# Patient Record
Sex: MALE | Race: WHITE | Employment: UNEMPLOYED | URBAN - METROPOLITAN AREA
[De-identification: names, ages, dates, MRNs, and addresses within clinical notes are randomized per-mention and may not be internally consistent; named-entity substitution may affect disease eponyms.]

---

## 2020-04-30 ENCOUNTER — HOSPITAL ENCOUNTER (EMERGENCY)
Age: 25
Discharge: LEFT BEFORE DISCHARGE | End: 2020-04-30
Payer: COMMERCIAL

## 2020-04-30 VITALS
HEART RATE: 111 BPM | WEIGHT: 182 LBS | SYSTOLIC BLOOD PRESSURE: 154 MMHG | RESPIRATION RATE: 18 BRPM | DIASTOLIC BLOOD PRESSURE: 86 MMHG | BODY MASS INDEX: 25.48 KG/M2 | HEIGHT: 71 IN | OXYGEN SATURATION: 96 % | TEMPERATURE: 97.6 F

## 2020-04-30 NOTE — ED TRIAGE NOTES
Pt to ED to speak with a mental health therapist. Pt denies any thoughts of wanting to harm self or others. Pt states he does not see a therapist on a daily basis but states he  \"probably should\".

## 2020-05-09 ENCOUNTER — HOSPITAL ENCOUNTER (OUTPATIENT)
Age: 25
Setting detail: SPECIMEN
Discharge: HOME | End: 2020-05-09
Payer: COMMERCIAL

## 2020-05-09 LAB
ALBUMIN SERPL-MCNC: 4.1 GM/DL (ref 3.4–5)
ALP BLD-CCNC: 58 IU/L (ref 40–128)
ALT SERPL-CCNC: 76 U/L (ref 10–40)
ANION GAP SERPL CALCULATED.3IONS-SCNC: 13 MMOL/L (ref 4–16)
AST SERPL-CCNC: 55 IU/L (ref 15–37)
BASOPHILS ABSOLUTE: 0.1 K/CU MM
BASOPHILS RELATIVE PERCENT: 0.7 % (ref 0–1)
BILIRUB SERPL-MCNC: 0.2 MG/DL (ref 0–1)
BUN BLDV-MCNC: 14 MG/DL (ref 6–23)
CALCIUM SERPL-MCNC: 9.4 MG/DL (ref 8.3–10.6)
CHLORIDE BLD-SCNC: 101 MMOL/L (ref 99–110)
CHOLESTEROL: 131 MG/DL
CO2: 24 MMOL/L (ref 21–32)
CREAT SERPL-MCNC: 0.9 MG/DL (ref 0.9–1.3)
DIFFERENTIAL TYPE: ABNORMAL
EOSINOPHILS ABSOLUTE: 0.3 K/CU MM
EOSINOPHILS RELATIVE PERCENT: 4.3 % (ref 0–3)
GFR AFRICAN AMERICAN: >60 ML/MIN/1.73M2
GFR NON-AFRICAN AMERICAN: >60 ML/MIN/1.73M2
GLUCOSE BLD-MCNC: 112 MG/DL (ref 70–99)
HCT VFR BLD CALC: 43.7 % (ref 42–52)
HDLC SERPL-MCNC: 53 MG/DL
HEMOGLOBIN: 14.3 GM/DL (ref 13.5–18)
IMMATURE NEUTROPHIL %: 0.3 % (ref 0–0.43)
LDL CHOLESTEROL DIRECT: 82 MG/DL
LYMPHOCYTES ABSOLUTE: 2 K/CU MM
LYMPHOCYTES RELATIVE PERCENT: 29.1 % (ref 24–44)
MCH RBC QN AUTO: 32.3 PG (ref 27–31)
MCHC RBC AUTO-ENTMCNC: 32.7 % (ref 32–36)
MCV RBC AUTO: 98.6 FL (ref 78–100)
MONOCYTES ABSOLUTE: 0.8 K/CU MM
MONOCYTES RELATIVE PERCENT: 11.9 % (ref 0–4)
NUCLEATED RBC %: 0 %
PDW BLD-RTO: 14 % (ref 11.7–14.9)
PLATELET # BLD: 307 K/CU MM (ref 140–440)
PMV BLD AUTO: 9.6 FL (ref 7.5–11.1)
POTASSIUM SERPL-SCNC: 4.7 MMOL/L (ref 3.5–5.1)
RBC # BLD: 4.43 M/CU MM (ref 4.6–6.2)
SEGMENTED NEUTROPHILS ABSOLUTE COUNT: 3.7 K/CU MM
SEGMENTED NEUTROPHILS RELATIVE PERCENT: 53.7 % (ref 36–66)
SODIUM BLD-SCNC: 138 MMOL/L (ref 135–145)
TOTAL IMMATURE NEUTOROPHIL: 0.02 K/CU MM
TOTAL NUCLEATED RBC: 0 K/CU MM
TOTAL PROTEIN: 6.3 GM/DL (ref 6.4–8.2)
TRIGL SERPL-MCNC: 45 MG/DL
WBC # BLD: 6.8 K/CU MM (ref 4–10.5)

## 2020-05-09 PROCEDURE — 36415 COLL VENOUS BLD VENIPUNCTURE: CPT

## 2020-05-09 PROCEDURE — 80053 COMPREHEN METABOLIC PANEL: CPT

## 2020-05-09 PROCEDURE — 83721 ASSAY OF BLOOD LIPOPROTEIN: CPT

## 2020-05-09 PROCEDURE — 85025 COMPLETE CBC W/AUTO DIFF WBC: CPT

## 2020-05-09 PROCEDURE — 80061 LIPID PANEL: CPT

## 2020-05-16 ENCOUNTER — APPOINTMENT (OUTPATIENT)
Dept: ULTRASOUND IMAGING | Age: 25
End: 2020-05-16
Payer: COMMERCIAL

## 2020-05-16 ENCOUNTER — HOSPITAL ENCOUNTER (EMERGENCY)
Age: 25
Discharge: HOME | End: 2020-05-16
Attending: EMERGENCY MEDICINE
Payer: COMMERCIAL

## 2020-05-16 ENCOUNTER — APPOINTMENT (OUTPATIENT)
Dept: GENERAL RADIOLOGY | Age: 25
End: 2020-05-16
Payer: COMMERCIAL

## 2020-05-16 ENCOUNTER — HOSPITAL ENCOUNTER (EMERGENCY)
Age: 25
Discharge: PSYCHIATRIC OR SUBSTANCE USE FACILITY | End: 2020-05-16
Payer: COMMERCIAL

## 2020-05-16 VITALS
TEMPERATURE: 98.1 F | OXYGEN SATURATION: 97 % | SYSTOLIC BLOOD PRESSURE: 140 MMHG | RESPIRATION RATE: 16 BRPM | DIASTOLIC BLOOD PRESSURE: 82 MMHG | HEART RATE: 81 BPM

## 2020-05-16 VITALS
WEIGHT: 185 LBS | HEART RATE: 85 BPM | SYSTOLIC BLOOD PRESSURE: 142 MMHG | TEMPERATURE: 98.2 F | HEIGHT: 71 IN | DIASTOLIC BLOOD PRESSURE: 80 MMHG | RESPIRATION RATE: 14 BRPM | BODY MASS INDEX: 25.9 KG/M2 | OXYGEN SATURATION: 98 %

## 2020-05-16 LAB
ACETAMINOPHEN LEVEL: <5 UG/ML (ref 15–30)
ALBUMIN SERPL-MCNC: 4.5 GM/DL (ref 3.4–5)
ALCOHOL SCREEN SERUM: <0.01 %WT/VOL
ALP BLD-CCNC: 77 IU/L (ref 40–129)
ALT SERPL-CCNC: 127 U/L (ref 10–40)
AMPHETAMINES: NEGATIVE
ANION GAP SERPL CALCULATED.3IONS-SCNC: 14 MMOL/L (ref 4–16)
AST SERPL-CCNC: 37 IU/L (ref 15–37)
BARBITURATE SCREEN URINE: NEGATIVE
BASOPHILS ABSOLUTE: 0.1 K/CU MM
BASOPHILS RELATIVE PERCENT: 0.5 % (ref 0–1)
BENZODIAZEPINE SCREEN, URINE: NEGATIVE
BILIRUB SERPL-MCNC: 0.5 MG/DL (ref 0–1)
BUN BLDV-MCNC: 16 MG/DL (ref 6–23)
CALCIUM SERPL-MCNC: 9.7 MG/DL (ref 8.3–10.6)
CANNABINOID SCREEN URINE: ABNORMAL
CHLORIDE BLD-SCNC: 101 MMOL/L (ref 99–110)
CO2: 24 MMOL/L (ref 21–32)
COCAINE METABOLITE: NEGATIVE
CREAT SERPL-MCNC: 0.8 MG/DL (ref 0.9–1.3)
DIFFERENTIAL TYPE: ABNORMAL
DOSE AMOUNT: ABNORMAL
DOSE AMOUNT: ABNORMAL
DOSE TIME: ABNORMAL
DOSE TIME: ABNORMAL
EOSINOPHILS ABSOLUTE: 0.1 K/CU MM
EOSINOPHILS RELATIVE PERCENT: 0.8 % (ref 0–3)
GFR AFRICAN AMERICAN: >60 ML/MIN/1.73M2
GFR NON-AFRICAN AMERICAN: >60 ML/MIN/1.73M2
GLUCOSE BLD-MCNC: 91 MG/DL (ref 70–99)
HCT VFR BLD CALC: 46.8 % (ref 42–52)
HEMOGLOBIN: 15.7 GM/DL (ref 13.5–18)
IMMATURE NEUTROPHIL %: 0.2 % (ref 0–0.43)
LYMPHOCYTES ABSOLUTE: 2 K/CU MM
LYMPHOCYTES RELATIVE PERCENT: 15.5 % (ref 24–44)
MCH RBC QN AUTO: 32.5 PG (ref 27–31)
MCHC RBC AUTO-ENTMCNC: 33.5 % (ref 32–36)
MCV RBC AUTO: 96.9 FL (ref 78–100)
MONOCYTES ABSOLUTE: 0.8 K/CU MM
MONOCYTES RELATIVE PERCENT: 6.2 % (ref 0–4)
NUCLEATED RBC %: 0 %
OPIATES, URINE: NEGATIVE
OXYCODONE: NEGATIVE
PDW BLD-RTO: 14.4 % (ref 11.7–14.9)
PHENCYCLIDINE, URINE: NEGATIVE
PLATELET # BLD: 417 K/CU MM (ref 140–440)
PMV BLD AUTO: 8.8 FL (ref 7.5–11.1)
POTASSIUM SERPL-SCNC: 4 MMOL/L (ref 3.5–5.1)
RBC # BLD: 4.83 M/CU MM (ref 4.6–6.2)
SALICYLATE LEVEL: <0.3 MG/DL (ref 15–30)
SEGMENTED NEUTROPHILS ABSOLUTE COUNT: 10 K/CU MM
SEGMENTED NEUTROPHILS RELATIVE PERCENT: 76.8 % (ref 36–66)
SODIUM BLD-SCNC: 139 MMOL/L (ref 135–145)
TOTAL IMMATURE NEUTOROPHIL: 0.03 K/CU MM
TOTAL NUCLEATED RBC: 0 K/CU MM
TOTAL PROTEIN: 7.6 GM/DL (ref 6.4–8.2)
WBC # BLD: 13.1 K/CU MM (ref 4–10.5)

## 2020-05-16 PROCEDURE — G0480 DRUG TEST DEF 1-7 CLASSES: HCPCS

## 2020-05-16 PROCEDURE — 73630 X-RAY EXAM OF FOOT: CPT

## 2020-05-16 PROCEDURE — 93971 EXTREMITY STUDY: CPT

## 2020-05-16 PROCEDURE — 80061 LIPID PANEL: CPT

## 2020-05-16 PROCEDURE — 6360000002 HC RX W HCPCS: Performed by: PHYSICIAN ASSISTANT

## 2020-05-16 PROCEDURE — 36415 COLL VENOUS BLD VENIPUNCTURE: CPT

## 2020-05-16 PROCEDURE — 99284 EMERGENCY DEPT VISIT MOD MDM: CPT

## 2020-05-16 PROCEDURE — 85025 COMPLETE CBC W/AUTO DIFF WBC: CPT

## 2020-05-16 PROCEDURE — 80307 DRUG TEST PRSMV CHEM ANLYZR: CPT

## 2020-05-16 PROCEDURE — 83721 ASSAY OF BLOOD LIPOPROTEIN: CPT

## 2020-05-16 PROCEDURE — 96372 THER/PROPH/DIAG INJ SC/IM: CPT

## 2020-05-16 PROCEDURE — 80053 COMPREHEN METABOLIC PANEL: CPT

## 2020-05-16 PROCEDURE — 99283 EMERGENCY DEPT VISIT LOW MDM: CPT

## 2020-05-16 RX ORDER — LORAZEPAM 2 MG/ML
2 INJECTION INTRAMUSCULAR ONCE
Status: COMPLETED | OUTPATIENT
Start: 2020-05-16 | End: 2020-05-16

## 2020-05-16 RX ADMIN — LORAZEPAM 2 MG: 2 INJECTION INTRAMUSCULAR; INTRAVENOUS at 12:17

## 2020-05-16 ASSESSMENT — ENCOUNTER SYMPTOMS
ABDOMINAL PAIN: 0
VOMITING: 0
EYE PAIN: 0
EYE DISCHARGE: 0
NAUSEA: 0
SORE THROAT: 0
COUGH: 0
BACK PAIN: 0
SHORTNESS OF BREATH: 0
RHINORRHEA: 0

## 2020-05-16 ASSESSMENT — PAIN DESCRIPTION - ORIENTATION: ORIENTATION: LEFT

## 2020-05-16 ASSESSMENT — PAIN SCALES - GENERAL: PAINLEVEL_OUTOF10: 0

## 2020-05-16 ASSESSMENT — PAIN DESCRIPTION - LOCATION: LOCATION: FOOT

## 2020-05-16 NOTE — ED NOTES
8617 called [facility] for transportation to [facility].       Radha Ferris  05/16/20 9491
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
## 2020-05-17 LAB
CHOLESTEROL: 145 MG/DL
HDLC SERPL-MCNC: 57 MG/DL
LDL CHOLESTEROL DIRECT: 86 MG/DL
TRIGL SERPL-MCNC: 55 MG/DL

## 2020-06-20 ENCOUNTER — HOSPITAL ENCOUNTER (EMERGENCY)
Age: 25
Discharge: HOME | End: 2020-06-20
Attending: EMERGENCY MEDICINE
Payer: COMMERCIAL

## 2020-06-20 VITALS
OXYGEN SATURATION: 99 % | HEIGHT: 71 IN | SYSTOLIC BLOOD PRESSURE: 132 MMHG | TEMPERATURE: 98.4 F | DIASTOLIC BLOOD PRESSURE: 78 MMHG | WEIGHT: 220 LBS | RESPIRATION RATE: 16 BRPM | BODY MASS INDEX: 30.8 KG/M2 | HEART RATE: 76 BPM

## 2020-06-20 LAB
ACETAMINOPHEN LEVEL: <5 UG/ML (ref 15–30)
ALBUMIN SERPL-MCNC: 4.6 GM/DL (ref 3.4–5)
ALCOHOL SCREEN SERUM: <0.01 %WT/VOL
ALP BLD-CCNC: 75 IU/L (ref 40–129)
ALT SERPL-CCNC: 22 U/L (ref 10–40)
AMPHETAMINES: NEGATIVE
ANION GAP SERPL CALCULATED.3IONS-SCNC: 10 MMOL/L (ref 4–16)
AST SERPL-CCNC: 28 IU/L (ref 15–37)
BARBITURATE SCREEN URINE: NEGATIVE
BASOPHILS ABSOLUTE: 0.1 K/CU MM
BASOPHILS RELATIVE PERCENT: 0.6 % (ref 0–1)
BENZODIAZEPINE SCREEN, URINE: NEGATIVE
BILIRUB SERPL-MCNC: 0.3 MG/DL (ref 0–1)
BUN BLDV-MCNC: 18 MG/DL (ref 6–23)
CALCIUM SERPL-MCNC: 9.8 MG/DL (ref 8.3–10.6)
CANNABINOID SCREEN URINE: ABNORMAL
CHLORIDE BLD-SCNC: 97 MMOL/L (ref 99–110)
CO2: 27 MMOL/L (ref 21–32)
COCAINE METABOLITE: NEGATIVE
CREAT SERPL-MCNC: 1.1 MG/DL (ref 0.9–1.3)
DIFFERENTIAL TYPE: ABNORMAL
DOSE AMOUNT: ABNORMAL
DOSE AMOUNT: ABNORMAL
DOSE TIME: ABNORMAL
DOSE TIME: ABNORMAL
EOSINOPHILS ABSOLUTE: 0.1 K/CU MM
EOSINOPHILS RELATIVE PERCENT: 0.7 % (ref 0–3)
GFR AFRICAN AMERICAN: >60 ML/MIN/1.73M2
GFR NON-AFRICAN AMERICAN: >60 ML/MIN/1.73M2
GLUCOSE BLD-MCNC: 102 MG/DL (ref 70–99)
HCT VFR BLD CALC: 41.9 % (ref 42–52)
HEMOGLOBIN: 14.3 GM/DL (ref 13.5–18)
IMMATURE NEUTROPHIL %: 0.4 % (ref 0–0.43)
LYMPHOCYTES ABSOLUTE: 2.3 K/CU MM
LYMPHOCYTES RELATIVE PERCENT: 19.9 % (ref 24–44)
MCH RBC QN AUTO: 32.9 PG (ref 27–31)
MCHC RBC AUTO-ENTMCNC: 34.1 % (ref 32–36)
MCV RBC AUTO: 96.3 FL (ref 78–100)
MONOCYTES ABSOLUTE: 1 K/CU MM
MONOCYTES RELATIVE PERCENT: 8.4 % (ref 0–4)
NUCLEATED RBC %: 0 %
OPIATES, URINE: NEGATIVE
OXYCODONE: NEGATIVE
PDW BLD-RTO: 13.2 % (ref 11.7–14.9)
PHENCYCLIDINE, URINE: NEGATIVE
PLATELET # BLD: 327 K/CU MM (ref 140–440)
PMV BLD AUTO: 8.8 FL (ref 7.5–11.1)
POTASSIUM SERPL-SCNC: 3.5 MMOL/L (ref 3.5–5.1)
RBC # BLD: 4.35 M/CU MM (ref 4.6–6.2)
SALICYLATE LEVEL: <0.3 MG/DL (ref 15–30)
SEGMENTED NEUTROPHILS ABSOLUTE COUNT: 8.1 K/CU MM
SEGMENTED NEUTROPHILS RELATIVE PERCENT: 70 % (ref 36–66)
SODIUM BLD-SCNC: 134 MMOL/L (ref 135–145)
TOTAL IMMATURE NEUTOROPHIL: 0.05 K/CU MM
TOTAL NUCLEATED RBC: 0 K/CU MM
TOTAL PROTEIN: 7.5 GM/DL (ref 6.4–8.2)
TSH HIGH SENSITIVITY: 2.51 UIU/ML (ref 0.27–4.2)
WBC # BLD: 11.6 K/CU MM (ref 4–10.5)

## 2020-06-20 PROCEDURE — 84443 ASSAY THYROID STIM HORMONE: CPT

## 2020-06-20 PROCEDURE — 80053 COMPREHEN METABOLIC PANEL: CPT

## 2020-06-20 PROCEDURE — 85025 COMPLETE CBC W/AUTO DIFF WBC: CPT

## 2020-06-20 PROCEDURE — 80307 DRUG TEST PRSMV CHEM ANLYZR: CPT

## 2020-06-20 PROCEDURE — 6370000000 HC RX 637 (ALT 250 FOR IP): Performed by: EMERGENCY MEDICINE

## 2020-06-20 PROCEDURE — 99285 EMERGENCY DEPT VISIT HI MDM: CPT

## 2020-06-20 PROCEDURE — G0480 DRUG TEST DEF 1-7 CLASSES: HCPCS

## 2020-06-20 RX ORDER — NICOTINE 21 MG/24HR
1 PATCH, TRANSDERMAL 24 HOURS TRANSDERMAL DAILY
Status: DISCONTINUED | OUTPATIENT
Start: 2020-06-20 | End: 2020-06-20 | Stop reason: HOSPADM

## 2020-06-20 RX ORDER — BUSPIRONE HYDROCHLORIDE 10 MG/1
10 TABLET ORAL 3 TIMES DAILY
Status: ON HOLD | COMMUNITY
End: 2020-08-17 | Stop reason: HOSPADM

## 2020-06-20 ASSESSMENT — ENCOUNTER SYMPTOMS
GASTROINTESTINAL NEGATIVE: 1
ALLERGIC/IMMUNOLOGIC NEGATIVE: 1
RESPIRATORY NEGATIVE: 1
EYES NEGATIVE: 1
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
## 2020-06-20 NOTE — ED NOTES
Elida Apodaca advises this nurse that the patient has continued to say, during her assessment, that he is not feeling like harming himself. He is very concerned about not having his medications of haldol, geodon, and buspar times 4 days, which is why he says he has been unable to sleep. Where he will be able to sleep is a concern, as well, as his father kicked him out of the house, according to the patient. Get Vasquez sts she will consult with case management here. The patient has also expressed his desire to know his blood type, and that he has the cure for COVID19 in his blood. One reason he may not have been taking his medications is because he has used marijuana recently, and something he says he heard from his psychiatrist was negative concerning mix pot with his meds.       Ana Gill RN  06/20/20 9563

## 2020-06-20 NOTE — ED NOTES
The sitter is excused at this time. The patient has a plan to follow up with Angel Russo and to follow up with [facility].       Aby Astudillo RN  06/20/20 0610

## 2020-06-24 ENCOUNTER — HOSPITAL ENCOUNTER (EMERGENCY)
Age: 25
Discharge: HOME | End: 2020-06-24
Attending: EMERGENCY MEDICINE
Payer: COMMERCIAL

## 2020-06-24 ENCOUNTER — APPOINTMENT (OUTPATIENT)
Dept: CT IMAGING | Age: 25
End: 2020-06-24
Payer: COMMERCIAL

## 2020-06-24 VITALS
HEART RATE: 78 BPM | RESPIRATION RATE: 17 BRPM | WEIGHT: 220 LBS | HEIGHT: 71 IN | BODY MASS INDEX: 30.8 KG/M2 | DIASTOLIC BLOOD PRESSURE: 72 MMHG | SYSTOLIC BLOOD PRESSURE: 134 MMHG | OXYGEN SATURATION: 99 % | TEMPERATURE: 98.3 F

## 2020-06-24 LAB
ALBUMIN SERPL-MCNC: 4.1 GM/DL (ref 3.4–5)
ALP BLD-CCNC: 71 IU/L (ref 40–129)
ALT SERPL-CCNC: 22 U/L (ref 10–40)
AMPHETAMINES: NEGATIVE
ANION GAP SERPL CALCULATED.3IONS-SCNC: 9 MMOL/L (ref 4–16)
AST SERPL-CCNC: 28 IU/L (ref 15–37)
BACTERIA: NEGATIVE /HPF
BARBITURATE SCREEN URINE: NEGATIVE
BASOPHILS ABSOLUTE: 0.1 K/CU MM
BASOPHILS RELATIVE PERCENT: 0.6 % (ref 0–1)
BENZODIAZEPINE SCREEN, URINE: NEGATIVE
BILIRUB SERPL-MCNC: 0.2 MG/DL (ref 0–1)
BILIRUBIN URINE: NEGATIVE MG/DL
BLOOD, URINE: NEGATIVE
BUN BLDV-MCNC: 16 MG/DL (ref 6–23)
CALCIUM SERPL-MCNC: 9.2 MG/DL (ref 8.3–10.6)
CANNABINOID SCREEN URINE: ABNORMAL
CAST TYPE: ABNORMAL /HPF
CHLORIDE BLD-SCNC: 102 MMOL/L (ref 99–110)
CLARITY: CLEAR
CO2: 27 MMOL/L (ref 21–32)
COCAINE METABOLITE: NEGATIVE
COLOR: ABNORMAL
CREAT SERPL-MCNC: 1 MG/DL (ref 0.9–1.3)
CRYSTAL TYPE: NEGATIVE /HPF
DIFFERENTIAL TYPE: ABNORMAL
EOSINOPHILS ABSOLUTE: 0.3 K/CU MM
EOSINOPHILS RELATIVE PERCENT: 2.8 % (ref 0–3)
EPITHELIAL CELLS, UA: 1 /HPF
GFR AFRICAN AMERICAN: >60 ML/MIN/1.73M2
GFR NON-AFRICAN AMERICAN: >60 ML/MIN/1.73M2
GLUCOSE BLD-MCNC: 122 MG/DL (ref 70–99)
GLUCOSE, URINE: NEGATIVE MG/DL
HCT VFR BLD CALC: 39.7 % (ref 42–52)
HEMOGLOBIN: 13.8 GM/DL (ref 13.5–18)
IMMATURE NEUTROPHIL %: 0.4 % (ref 0–0.43)
KETONES, URINE: NEGATIVE MG/DL
LEUKOCYTE ESTERASE, URINE: NEGATIVE
LIPASE: 39 IU/L (ref 13–60)
LYMPHOCYTES ABSOLUTE: 3 K/CU MM
LYMPHOCYTES RELATIVE PERCENT: 30.3 % (ref 24–44)
MCH RBC QN AUTO: 32.9 PG (ref 27–31)
MCHC RBC AUTO-ENTMCNC: 34.8 % (ref 32–36)
MCV RBC AUTO: 94.7 FL (ref 78–100)
MONOCYTES ABSOLUTE: 0.9 K/CU MM
MONOCYTES RELATIVE PERCENT: 8.8 % (ref 0–4)
NITRITE URINE, QUANTITATIVE: NEGATIVE
OPIATES, URINE: NEGATIVE
OXYCODONE: NEGATIVE
PDW BLD-RTO: 13.2 % (ref 11.7–14.9)
PH, URINE: 7 (ref 5–8)
PHENCYCLIDINE, URINE: NEGATIVE
PLATELET # BLD: 268 K/CU MM (ref 140–440)
PMV BLD AUTO: 9 FL (ref 7.5–11.1)
POTASSIUM SERPL-SCNC: 3.8 MMOL/L (ref 3.5–5.1)
PROTEIN UA: NEGATIVE MG/DL
RBC # BLD: 4.19 M/CU MM (ref 4.6–6.2)
RBC URINE: ABNORMAL /HPF (ref 0–3)
SEGMENTED NEUTROPHILS ABSOLUTE COUNT: 5.6 K/CU MM
SEGMENTED NEUTROPHILS RELATIVE PERCENT: 57.1 % (ref 36–66)
SODIUM BLD-SCNC: 138 MMOL/L (ref 135–145)
SPECIFIC GRAVITY UA: 1.01 (ref 1–1.03)
TOTAL IMMATURE NEUTOROPHIL: 0.04 K/CU MM
TOTAL PROTEIN: 6.9 GM/DL (ref 6.4–8.2)
UROBILINOGEN, URINE: 0.2 MG/DL (ref 0.2–1)
WBC # BLD: 9.9 K/CU MM (ref 4–10.5)
WBC UA: 1 /HPF (ref 0–2)

## 2020-06-24 PROCEDURE — 2580000003 HC RX 258: Performed by: EMERGENCY MEDICINE

## 2020-06-24 PROCEDURE — 96365 THER/PROPH/DIAG IV INF INIT: CPT

## 2020-06-24 PROCEDURE — 74176 CT ABD & PELVIS W/O CONTRAST: CPT

## 2020-06-24 PROCEDURE — 85025 COMPLETE CBC W/AUTO DIFF WBC: CPT

## 2020-06-24 PROCEDURE — 6360000002 HC RX W HCPCS: Performed by: EMERGENCY MEDICINE

## 2020-06-24 PROCEDURE — 96375 TX/PRO/DX INJ NEW DRUG ADDON: CPT

## 2020-06-24 PROCEDURE — 81001 URINALYSIS AUTO W/SCOPE: CPT

## 2020-06-24 PROCEDURE — 80307 DRUG TEST PRSMV CHEM ANLYZR: CPT

## 2020-06-24 PROCEDURE — 99284 EMERGENCY DEPT VISIT MOD MDM: CPT

## 2020-06-24 PROCEDURE — 80053 COMPREHEN METABOLIC PANEL: CPT

## 2020-06-24 PROCEDURE — 83690 ASSAY OF LIPASE: CPT

## 2020-06-24 RX ORDER — KETOROLAC TROMETHAMINE 10 MG/1
10 TABLET, FILM COATED ORAL EVERY 6 HOURS PRN
Qty: 20 TABLET | Refills: 0 | Status: ON HOLD | OUTPATIENT
Start: 2020-06-24 | End: 2020-08-17 | Stop reason: HOSPADM

## 2020-06-24 RX ORDER — ONDANSETRON 4 MG/1
4 TABLET, ORALLY DISINTEGRATING ORAL EVERY 8 HOURS PRN
Qty: 20 TABLET | Refills: 0 | Status: SHIPPED | OUTPATIENT
Start: 2020-06-24 | End: 2020-07-01

## 2020-06-24 RX ORDER — SODIUM CHLORIDE, SODIUM LACTATE, POTASSIUM CHLORIDE, CALCIUM CHLORIDE 600; 310; 30; 20 MG/100ML; MG/100ML; MG/100ML; MG/100ML
1000 INJECTION, SOLUTION INTRAVENOUS ONCE
Status: COMPLETED | OUTPATIENT
Start: 2020-06-24 | End: 2020-06-24

## 2020-06-24 RX ORDER — MORPHINE SULFATE 4 MG/ML
4 INJECTION, SOLUTION INTRAMUSCULAR; INTRAVENOUS
Status: DISCONTINUED | OUTPATIENT
Start: 2020-06-24 | End: 2020-06-24 | Stop reason: HOSPADM

## 2020-06-24 RX ORDER — CLOTRIMAZOLE 1 %
CREAM (GRAM) TOPICAL
Qty: 1 TUBE | Refills: 2 | Status: SHIPPED | OUTPATIENT
Start: 2020-06-24 | End: 2020-07-01

## 2020-06-24 RX ORDER — TAMSULOSIN HYDROCHLORIDE 0.4 MG/1
0.4 CAPSULE ORAL DAILY
Qty: 5 CAPSULE | Refills: 0 | Status: ON HOLD | OUTPATIENT
Start: 2020-06-24 | End: 2020-08-17 | Stop reason: HOSPADM

## 2020-06-24 RX ORDER — CYCLOBENZAPRINE HCL 10 MG
10 TABLET ORAL NIGHTLY PRN
Qty: 30 TABLET | Refills: 0 | Status: SHIPPED | OUTPATIENT
Start: 2020-06-24 | End: 2020-07-04

## 2020-06-24 RX ORDER — ONDANSETRON 2 MG/ML
4 INJECTION INTRAMUSCULAR; INTRAVENOUS EVERY 30 MIN PRN
Status: DISCONTINUED | OUTPATIENT
Start: 2020-06-24 | End: 2020-06-24 | Stop reason: HOSPADM

## 2020-06-24 RX ORDER — KETOROLAC TROMETHAMINE 30 MG/ML
30 INJECTION, SOLUTION INTRAMUSCULAR; INTRAVENOUS ONCE
Status: COMPLETED | OUTPATIENT
Start: 2020-06-24 | End: 2020-06-24

## 2020-06-24 RX ADMIN — ONDANSETRON 4 MG: 2 INJECTION INTRAMUSCULAR; INTRAVENOUS at 04:05

## 2020-06-24 RX ADMIN — SODIUM CHLORIDE, POTASSIUM CHLORIDE, SODIUM LACTATE AND CALCIUM CHLORIDE 1000 ML: 600; 310; 30; 20 INJECTION, SOLUTION INTRAVENOUS at 04:05

## 2020-06-24 RX ADMIN — KETOROLAC TROMETHAMINE 30 MG: 30 INJECTION, SOLUTION INTRAMUSCULAR; INTRAVENOUS at 04:05

## 2020-06-24 ASSESSMENT — ENCOUNTER SYMPTOMS
BACK PAIN: 0
CHEST TIGHTNESS: 0
GASTROINTESTINAL NEGATIVE: 1
DIARRHEA: 0
CHOKING: 0
EYE DISCHARGE: 0
RECTAL PAIN: 0
FACIAL SWELLING: 0
STRIDOR: 0
CONSTIPATION: 0
RESPIRATORY NEGATIVE: 1
RHINORRHEA: 0
APNEA: 0
BLOOD IN STOOL: 0
TROUBLE SWALLOWING: 0
EYE REDNESS: 0
EYE PAIN: 0
PHOTOPHOBIA: 0
EYES NEGATIVE: 1
WHEEZING: 0
VOICE CHANGE: 0
ABDOMINAL PAIN: 0
EYE ITCHING: 0
SHORTNESS OF BREATH: 0
NAUSEA: 0
SINUS PAIN: 0
COUGH: 0
SINUS PRESSURE: 0
VOMITING: 0

## 2020-06-24 ASSESSMENT — PAIN DESCRIPTION - PAIN TYPE: TYPE: ACUTE PAIN

## 2020-06-24 ASSESSMENT — PAIN DESCRIPTION - ORIENTATION: ORIENTATION: LEFT

## 2020-06-24 ASSESSMENT — PAIN DESCRIPTION - FREQUENCY: FREQUENCY: CONTINUOUS

## 2020-06-24 ASSESSMENT — PAIN SCALES - GENERAL
PAINLEVEL_OUTOF10: 6
PAINLEVEL_OUTOF10: 6

## 2020-06-24 ASSESSMENT — PAIN DESCRIPTION - PROGRESSION: CLINICAL_PROGRESSION: RESOLVED

## 2020-06-24 ASSESSMENT — PAIN DESCRIPTION - ONSET: ONSET: ON-GOING

## 2020-06-24 ASSESSMENT — PAIN DESCRIPTION - LOCATION: LOCATION: FLANK

## 2020-06-24 NOTE — ED TRIAGE NOTES
Pt brought to the ED by EMS for bilateral flank pain and fungal infection to his feet per report. Pt states Lt sided flank pain is severe compared to Rt side.

## 2020-06-24 NOTE — ED PROVIDER NOTES
98.3 °F (36.8 °C)   TempSrc: Oral   SpO2: 96%   Weight: 220 lb (99.8 kg)   Height: 5' 11\" (1.803 m)           MDM  Number of Diagnoses or Management Options  Flank pain:   Tinea pedis of both feet:   Diagnosis management comments: 78-year-old male presents emergency department with chief complaint of left-sided flank pain that started approximately 12 hours ago. CT of the abdomen pelvis was negative for ureteral stone. Patient also has tinea pedis. Patient was given clotrimazole, ketorolac, Flexeril. Discharged home with return precautions and primary care follow-up. Amount and/or Complexity of Data Reviewed  Clinical lab tests: ordered and reviewed  Tests in the radiology section of CPT®: ordered and reviewed  Tests in the medicine section of CPT®: ordered and reviewed    Risk of Complications, Morbidity, and/or Mortality  Presenting problems: moderate  Diagnostic procedures: moderate  Management options: moderate    Critical Care  Total time providing critical care: < 30 minutes    Patient Progress  Patient progress: improved        REASSESSMENT          CRITICAL CARE TIME     Total critical care time provided today was 0 minutes. This excludes seperately billable procedures and family discussion time. Critical care time provided for obtaining history, conducting a physical exam, performing and monitoring interventions, ordering, collecting and interpreting tests, and establishing medical decision-making. There was a potential for life/limb threatening pathology requiring close evaluation and intervention with concern for patient decompensation. CONSULTS:  None    PROCEDURES:  None performed unless otherwise noted below     Procedures        FINAL IMPRESSION      1. Flank pain    2.  Tinea pedis of both feet          DISPOSITION/PLAN   DISPOSITION Decision To Discharge 06/24/2020 04:02:24 AM      PATIENT REFERRED TO:  Angel Russo MD  [address] [place] [address]     In 3

## 2020-06-24 NOTE — ED NOTES
Levi Billing with [employer] called back, they will be here in approximately 30 minutes to take pt home.       Zaira Loza RN  06/24/20 6009

## 2020-06-24 NOTE — ED NOTES
Bed: E06  Expected date:   Expected time:   Means of arrival:   Comments:  ems     Catracho Olmos RN  06/24/20 6253

## 2020-06-24 NOTE — ED NOTES
Convenient transport has not yet called back, pt provided this RN with pt sister Guilherme Rios' number, [phone]. Attempted to contact sister at provided number per pt. No answer, message left to call this RN back.       Ghulam Joyner RN  06/24/20 8624

## 2020-06-24 NOTE — ED NOTES
Multiple attempts made to call pt sister and convenient transport without success. [facility] [employer] called, message left to call back.       Rosa Isela Hui RN  06/24/20 9512

## 2020-06-24 NOTE — ED NOTES
Pt states does not have a ride home, usually get transportation set up by social workers at [facility] when discharged.       Candelario Aguilera RN  06/24/20 3244

## 2020-06-29 ENCOUNTER — HOSPITAL ENCOUNTER (EMERGENCY)
Age: 25
Discharge: HOME | End: 2020-06-29
Payer: COMMERCIAL

## 2020-06-29 VITALS
DIASTOLIC BLOOD PRESSURE: 76 MMHG | BODY MASS INDEX: 30.8 KG/M2 | SYSTOLIC BLOOD PRESSURE: 142 MMHG | WEIGHT: 220 LBS | RESPIRATION RATE: 18 BRPM | HEART RATE: 87 BPM | TEMPERATURE: 98.4 F | HEIGHT: 71 IN | OXYGEN SATURATION: 97 %

## 2020-06-29 PROCEDURE — 99282 EMERGENCY DEPT VISIT SF MDM: CPT

## 2020-06-29 NOTE — ED PROVIDER NOTES
EMERGENCY DEPARTMENT ENCOUNTER      PCP: Yelena Baker MD    CHIEF COMPLAINT    Chief Complaint   Patient presents with    Other     pt c/o fungal infection to his feet acute on chronic        This patient was not evaluated by the attending physician. I have independently evaluated this patient. HPI    Eun Albright is a 30 y.o. male who presents with rash and pain in bilateral feet. Patient says he was prescribed antifungal cream a week ago and was putting on once a day for 3 days and then 2 times a day for the next 4 days. He says that is helping a little bit but the rash has not fully gone away and it hurts for him to walk. He says that he walks everywhere because he wants to \"reduce his carbon footprint\" and he has just recently began changing his socks every day. He denies any sensory or functional deficit in the lower extremities. No fevers, nausea, vomiting or diarrhea.         REVIEW OF SYSTEMS    Constitutional:  Denies fever, chills, weight loss or weakness   HENT:  Denies sore throat or ear pain   Cardiovascular:  Denies chest pain, palpitations   Respiratory:  Denies cough or shortness of breath    GI:  Denies abdominal pain, nausea, vomiting, or diarrhea  :  Denies any urinary symptoms   Musculoskeletal:  Denies back pain  Skin: See HPI  Neurologic:  Denies headache, focal weakness or sensory changes   Endocrine:  Denies polyuria or polydypsia   Lymphatic:  Denies swollen glands     All other review of systems are negative  See HPI and nursing notes for additional information     PAST MEDICAL AND SURGICAL HISTORY    Past Medical History:   Diagnosis Date    Arachnoid cyst     Asthma     Eczema     GERD (gastroesophageal reflux disease)     Mood disorder, drug-induced ([facility] [address]     Suicidal ideation      Past Surgical History:   Procedure Laterality Date    APPENDECTOMY      BRAIN SURGERY         CURRENT MEDICATIONS    Current Outpatient Rx   Medication Sig Dispense Refill    ketorolac (TORADOL) 10 MG tablet Take 1 tablet by mouth every 6 hours as needed for Pain 20 tablet 0    ondansetron (ZOFRAN-ODT) 4 MG disintegrating tablet Place 1 tablet under the tongue every 8 hours as needed for Nausea or Vomiting May Sub regular tablet (non-ODT) if insurance does not cover ODT. 20 tablet 0    tamsulosin (FLOMAX) 0.4 MG capsule Take 1 capsule by mouth daily for 5 days 5 capsule 0    clotrimazole (LOTRIMIN AF) 1 % cream Apply topically 2 times daily. 1 Tube 2    cyclobenzaprine (FLEXERIL) 10 MG tablet Take 1 tablet by mouth nightly as needed for Muscle spasms 30 tablet 0    busPIRone (BUSPAR) 10 MG tablet Take 10 mg by mouth 3 times daily One in the morning and two before bed      HydrOXYzine Pamoate (VISTARIL PO) Take by mouth nightly      melatonin 3 MG TABS tablet Take 3 mg by mouth nightly as needed      Fluticasone Furoate-Vilanterol (BREO ELLIPTA IN) Inhale into the lungs      albuterol sulfate HFA (PROVENTIL HFA) 108 (90 Base) MCG/ACT inhaler Inhale 2 puffs into the lungs every 4 hours as needed for Wheezing or Shortness of Breath With spacer (and mask if indicated). Thanks. 1 Inhaler 1    Respiratory Therapy Supplies (NEBULIZER COMPRESSOR) KIT 1 kit by Does not apply route once for 1 dose. 1 kit 0    ipratropium (ATROVENT) 0.02 % nebulizer solution Take 2.5 mLs by nebulization 4 times daily. 50 mL 1    traZODone (DESYREL) 50 MG tablet Take 50 mg by mouth nightly.  ziprasidone (GEODON) 80 MG capsule Take 60 mg by mouth nightly.          ALLERGIES    Allergies   Allergen Reactions    Latex Itching    Chantix [Varenicline] Other (See Comments)     Causes suicidal thoughts    Seasonal Other (See Comments)     Dust mites, pollen, weed, etc - \"it makes my asthma act up\"       SOCIAL AND FAMILY HISTORY    Social History     Socioeconomic History    Marital status: Single     Spouse name: None    Number of children: None    Years of education: None    Highest education level: None   Occupational History    None   Social Needs    Financial resource strain: None    Food insecurity     Worry: None     Inability: None    Transportation needs     Medical: None     Non-medical: None   Tobacco Use    Smoking status: Current Every Day Smoker     Packs/day: 1.50     Types: Cigarettes    Smokeless tobacco: Never Used   Substance and Sexual Activity    Alcohol use: Yes     Comment: 3 beers, two shots of liquor     Drug use: Yes     Frequency: 5.0 times per week     Types: Marijuana    Sexual activity: Never   Lifestyle    Physical activity     Days per week: None     Minutes per session: None    Stress: None   Relationships    Social connections     Talks on phone: None     Gets together: None     Attends Anglican service: None     Active member of club or organization: None     Attends meetings of clubs or organizations: None     Relationship status: None    Intimate partner violence     Fear of current or ex partner: None     Emotionally abused: None     Physically abused: None     Forced sexual activity: None   Other Topics Concern    None   Social History Narrative    ** Merged History Encounter **          History reviewed. No pertinent family history. PHYSICAL EXAM    VITAL SIGNS: BP (!) 142/76   Pulse 87   Temp 98.4 °F (36.9 °C) (Temporal)   Resp 18   Ht 5' 11\" (1.803 m)   Wt 220 lb (99.8 kg)   SpO2 97%   BMI 30.68 kg/m²    Constitutional:  Well developed, Well nourished, NAD  HENT:  Normocephalic, Atraumatic, PERRL. EOMI. Sclera clear. Conjunctiva normal, No discharge. Neck/Lymphatics: supple, no JVD, no swollen nodes  Cardiovascular:  Rate 87, regular rhythm,  no murmurs/rubs/gallops. Respiratory:  Nonlabored breathing. Normal breath sounds, No wheezing  Abdomen: Bowel sounds normal, Soft, No tenderness, no masses. Musculoskeletal:    There is no edema, asymmetry, or calf / thigh tenderness bilaterally. No cyanosis.     No cool or pale-appearing limb.  Distal cap refill and pulses intact bilateral upper and lower extremities  Bilateral upper and lower extremity ROM intact without pain or obvious deficit  Integument: There is erythema with scale between the toes of bilateral feet  Neurologic: Alert & oriented , No focal deficits noted. Cranial nerves II through XII grossly intact. Normal gross motor coordination & motor strength bilateral upper and lower extremities  Sensation intact. Psychiatric:  Affect normal, Mood normal.         ED COURSE & MEDICAL DECISION MAKING       Patient presents as above with complaints of itching and pain in the feet. Physical exam reveals a rash that appears to be tinea pedis he is currently being treated for this condition with. He is only been using a cream for 1 week. Discussed with him that fungal infections can take a long time to clear and encouraged him to continue to use the cream that he was prescribed. We also had a lengthy discussion about foot care techniques and the importance of keeping his feet clean and dry. He understands and agrees with this plan of care and return precautions were reviewed at length. Time allotted for answer questions and patient is ready for discharge. Patient agrees to return emergency department if symptoms worsen or any new symptoms develop. Vital signs and nursing notes reviewed during ED course. Clinical  IMPRESSION    1. Tinea pedis of both feet          Comment: Please note this report has been produced using speech recognition software and may contain errors related to that system including errors in grammar, punctuation, and spelling, as well as words and phrases that may be inappropriate. If there are any questions or concerns please feel free to contact the dictating provider for clarification.           [place], [place]  06/29/20 5760

## 2020-06-29 NOTE — ED TRIAGE NOTES
Pt returned to the ED for c/o fungal infection to bilateral feet to which he has been treating with topical cream but states no relief.

## 2020-07-02 ENCOUNTER — HOSPITAL ENCOUNTER (EMERGENCY)
Age: 25
Discharge: HOME | End: 2020-07-03
Attending: EMERGENCY MEDICINE
Payer: COMMERCIAL

## 2020-07-02 LAB
ALBUMIN SERPL-MCNC: 4.4 GM/DL (ref 3.4–5)
ALCOHOL SCREEN SERUM: <0.01 %WT/VOL
ALP BLD-CCNC: 65 IU/L (ref 40–128)
ALT SERPL-CCNC: 24 U/L (ref 10–40)
AMPHETAMINES: NEGATIVE
ANION GAP SERPL CALCULATED.3IONS-SCNC: 10 MMOL/L (ref 4–16)
AST SERPL-CCNC: 33 IU/L (ref 15–37)
BARBITURATE SCREEN URINE: NEGATIVE
BASOPHILS ABSOLUTE: 0.1 K/CU MM
BASOPHILS RELATIVE PERCENT: 0.6 % (ref 0–1)
BENZODIAZEPINE SCREEN, URINE: NEGATIVE
BILIRUB SERPL-MCNC: 0.7 MG/DL (ref 0–1)
BUN BLDV-MCNC: 16 MG/DL (ref 6–23)
CALCIUM SERPL-MCNC: 9.6 MG/DL (ref 8.3–10.6)
CANNABINOID SCREEN URINE: ABNORMAL
CHLORIDE BLD-SCNC: 101 MMOL/L (ref 99–110)
CO2: 23 MMOL/L (ref 21–32)
COCAINE METABOLITE: NEGATIVE
CREAT SERPL-MCNC: 0.9 MG/DL (ref 0.9–1.3)
DIFFERENTIAL TYPE: ABNORMAL
EOSINOPHILS ABSOLUTE: 0.2 K/CU MM
EOSINOPHILS RELATIVE PERCENT: 2.2 % (ref 0–3)
GFR AFRICAN AMERICAN: >60 ML/MIN/1.73M2
GFR NON-AFRICAN AMERICAN: >60 ML/MIN/1.73M2
GLUCOSE BLD-MCNC: 77 MG/DL (ref 70–99)
HCT VFR BLD CALC: 41.8 % (ref 42–52)
HEMOGLOBIN: 14.1 GM/DL (ref 13.5–18)
IMMATURE NEUTROPHIL %: 0.4 % (ref 0–0.43)
LYMPHOCYTES ABSOLUTE: 2.2 K/CU MM
LYMPHOCYTES RELATIVE PERCENT: 21.6 % (ref 24–44)
MCH RBC QN AUTO: 32.8 PG (ref 27–31)
MCHC RBC AUTO-ENTMCNC: 33.7 % (ref 32–36)
MCV RBC AUTO: 97.2 FL (ref 78–100)
MONOCYTES ABSOLUTE: 1 K/CU MM
MONOCYTES RELATIVE PERCENT: 10.2 % (ref 0–4)
NUCLEATED RBC %: 0 %
OPIATES, URINE: NEGATIVE
OXYCODONE: NEGATIVE
PDW BLD-RTO: 13.5 % (ref 11.7–14.9)
PHENCYCLIDINE, URINE: NEGATIVE
PLATELET # BLD: 311 K/CU MM (ref 140–440)
PMV BLD AUTO: 8.8 FL (ref 7.5–11.1)
POTASSIUM SERPL-SCNC: 3.7 MMOL/L (ref 3.5–5.1)
RBC # BLD: 4.3 M/CU MM (ref 4.6–6.2)
SEGMENTED NEUTROPHILS ABSOLUTE COUNT: 6.6 K/CU MM
SEGMENTED NEUTROPHILS RELATIVE PERCENT: 65 % (ref 36–66)
SODIUM BLD-SCNC: 134 MMOL/L (ref 135–145)
TOTAL IMMATURE NEUTOROPHIL: 0.04 K/CU MM
TOTAL NUCLEATED RBC: 0 K/CU MM
TOTAL PROTEIN: 7.4 GM/DL (ref 6.4–8.2)
WBC # BLD: 10.2 K/CU MM (ref 4–10.5)

## 2020-07-02 PROCEDURE — 96372 THER/PROPH/DIAG INJ SC/IM: CPT

## 2020-07-02 PROCEDURE — 99285 EMERGENCY DEPT VISIT HI MDM: CPT

## 2020-07-02 PROCEDURE — 6360000002 HC RX W HCPCS: Performed by: EMERGENCY MEDICINE

## 2020-07-02 PROCEDURE — 80307 DRUG TEST PRSMV CHEM ANLYZR: CPT

## 2020-07-02 PROCEDURE — 80053 COMPREHEN METABOLIC PANEL: CPT

## 2020-07-02 PROCEDURE — G0480 DRUG TEST DEF 1-7 CLASSES: HCPCS

## 2020-07-02 PROCEDURE — 85025 COMPLETE CBC W/AUTO DIFF WBC: CPT

## 2020-07-02 RX ORDER — ZIPRASIDONE MESYLATE 20 MG/ML
10 INJECTION, POWDER, LYOPHILIZED, FOR SOLUTION INTRAMUSCULAR ONCE
Status: COMPLETED | OUTPATIENT
Start: 2020-07-02 | End: 2020-07-02

## 2020-07-02 RX ADMIN — ZIPRASIDONE MESYLATE 10 MG: 20 INJECTION, POWDER, LYOPHILIZED, FOR SOLUTION INTRAMUSCULAR at 22:18

## 2020-07-02 NOTE — ED PROVIDER NOTES
Emergency Department Encounter    Patient: Ella Warren  MRN: [id number]  : 1995  Date of Evaluation: 2020  ED Provider:  Dione Underwood    Triage Chief Complaint:   Mental Health Problem    [place]:  Ella Warren is a 30 y.o. male that presents for suicidal ideations, apparently he was walking barefoot and acting bizarre outside, police pulled over and called EMS apparently he has a mental health history and he voiced some thoughts of wanting to harm himself which he also did with nursing staff, to me he is denying this apparently he told nursing staff he does not have any reason to live anymore. ROS - see HPI, below listed is current ROS at time of my eval:  General:  No fevers  Eyes:  No recent vison changes  ENT:  No sore throat, no nasal congestion  Cardiovascular:  No chest pain, no palpitations  Respiratory:  No shortness of breath  Gastrointestinal:  No pain, no nausea, no vomiting, no diarrhea  Musculoskeletal:  No muscle pain  Skin:  No rash  Neurologic:  no headache  Psychiatric:  No anxiety, + depression  Genitourinary:  No dysuria  Endocrine:  No unexpected weight gain, no unexpected weight loss  Extremities:  no edema, no pain    Past Medical History:   Diagnosis Date    Arachnoid cyst     Asthma     Eczema     GERD (gastroesophageal reflux disease)     Mood disorder, drug-induced ([address]     Suicidal ideation      Past Surgical History:   Procedure Laterality Date    APPENDECTOMY      BRAIN SURGERY       History reviewed. No pertinent family history.   Social History     Socioeconomic History    Marital status: Single     Spouse name: Not on file    Number of children: Not on file    Years of education: Not on file    Highest education level: Not on file   Occupational History    Not on file   Social Needs    Financial resource strain: Not on file    Food insecurity     Worry: Not on file     Inability: Not on file    Transportation needs     Medical: Not on file MCG/ACT inhaler Inhale 2 puffs into the lungs every 4 hours as needed for Wheezing or Shortness of Breath With spacer (and mask if indicated). Thanks. 1 Inhaler 1    Respiratory Therapy Supplies (NEBULIZER COMPRESSOR) KIT 1 kit by Does not apply route once for 1 dose. 1 kit 0    ipratropium (ATROVENT) 0.02 % nebulizer solution Take 2.5 mLs by nebulization 4 times daily. 50 mL 1    traZODone (DESYREL) 50 MG tablet Take 50 mg by mouth nightly.  ziprasidone (GEODON) 80 MG capsule Take 60 mg by mouth nightly. Allergies   Allergen Reactions    Latex Itching    Chantix [Varenicline] Other (See Comments)     Causes suicidal thoughts    Seasonal Other (See Comments)     Dust mites, pollen, weed, etc - \"it makes my asthma act up\"       Nursing Notes Reviewed    Physical Exam:  Triage VS:    ED Triage Vitals   Enc Vitals Group      BP       Pulse       Resp       Temp       Temp src       SpO2       Weight       Height       Head Circumference       Peak Flow       Pain Score       Pain Loc       Pain Edu? Excl. in [address]? General appearance:  No acute distress. Skin:  Warm. Dry. Eye:  Extraocular movements intact. Ears, nose, mouth and throat:  Oral mucosa moist   Neck:  Trachea midline. Extremity: Normal ROM     Heart:  Regular  Perfusion:  intact  Respiratory:   Respirations nonlabored. Abdominal:  Non distended. Neurological:  Alert and oriented times 3. No focal neuro deficits.              Psychiatric:  Flat, + depression, + suicidal ideations, no homicidal ideations    I have reviewed and interpreted all of the currently available lab results from this visit (if applicable):  Results for orders placed or performed during the hospital encounter of 07/02/20   CBC Auto Differential   Result Value Ref Range    WBC 10.2 4.0 - 10.5 K/CU MM    RBC 4.30 (L) 4.6 - 6.2 M/CU MM    Hemoglobin 14.1 13.5 - 18.0 GM/DL    Hematocrit 41.8 (L) 42 - 52 %    MCV 97.2 78 - 100 FL    MCH 32.8 (H) 27 - 31 PG    MCHC 33.7 32.0 - 36.0 %    RDW 13.5 11.7 - 14.9 %    Platelets 722 210 - 826 K/CU MM    MPV 8.8 7.5 - 11.1 FL    Differential Type AUTOMATED DIFFERENTIAL     Segs Relative 65.0 36 - 66 %    Lymphocytes % 21.6 (L) 24 - 44 %    Monocytes % 10.2 (H) 0 - 4 %    Eosinophils % 2.2 0 - 3 %    Basophils % 0.6 0 - 1 %    Segs Absolute 6.6 K/CU MM    Lymphocytes Absolute 2.2 K/CU MM    Monocytes Absolute 1.0 K/CU MM    Eosinophils Absolute 0.2 K/CU MM    Basophils Absolute 0.1 K/CU MM    Nucleated RBC % 0.0 %    Total Nucleated RBC 0.0 K/CU MM    Total Immature Neutrophil 0.04 K/CU MM    Immature Neutrophil % 0.4 0 - 0.43 %   Comprehensive Metabolic Panel   Result Value Ref Range    Sodium 134 (L) 135 - 145 MMOL/L    Potassium 3.7 3.5 - 5.1 MMOL/L    Chloride 101 99 - 110 mMol/L    CO2 23 21 - 32 MMOL/L    BUN 16 6 - 23 MG/DL    CREATININE 0.9 0.9 - 1.3 MG/DL    Glucose 77 70 - 99 MG/DL    Calcium 9.6 8.3 - 10.6 MG/DL    Alb 4.4 3.4 - 5.0 GM/DL    Total Protein 7.4 6.4 - 8.2 GM/DL    Total Bilirubin 0.7 0.0 - 1.0 MG/DL    ALT 24 10 - 40 U/L    AST 33 15 - 37 IU/L    Alkaline Phosphatase 65 40 - 128 IU/L    GFR Non-African American >60 >60 mL/min/1.73m2    GFR African American >60 >60 mL/min/1.73m2    Anion Gap 10 4 - 16   Ethanol   Result Value Ref Range    Alcohol Scrn <0.01 <0.01 %WT/VOL   Urine Drug Screen   Result Value Ref Range    Cannabinoid Scrn, Ur UNCONFIRMED POSITIVE (A) NEGATIVE    Amphetamines NEGATIVE NEGATIVE    Cocaine Metabolite NEGATIVE NEGATIVE    Benzodiazepine Screen, Urine NEGATIVE NEGATIVE    Barbiturate Screen, Ur NEGATIVE NEGATIVE    Opiates, Urine NEGATIVE NEGATIVE    Phencyclidine, Urine NEGATIVE NEGATIVE    Oxycodone NEGATIVE NEGATIVE      Radiographs (if obtained):  Radiologist's Report Reviewed:  No results found. EKG (if obtained): (All EKG's are interpreted by myself in the absence of a cardiologist)    MDM:  Patient presenting for depression as well as thoughts of suicide. The patient was placed in suicide precautions, patient's clothing and belongings were removed, documented and stored in the emergency department. Patient's workup was initiated lab results as above, at this point patient is medically cleared. Mental health/crisis worker will be notified, patient's disposition is per their evaluation and discussion with psychiatrist.    Clinical Impression:  1. Depression, unspecified depression type      Disposition referral (if applicable):  No follow-up provider specified. Disposition medications (if applicable):  New Prescriptions    No medications on file     ED Provider Disposition Time  DISPOSITION        Comment: Please note this report has been produced using speech recognition software and may contain errors related to that system including errors in grammar, punctuation, and spelling, as well as words and phrases that may be inappropriate. Efforts were made to edit the dictations.         Verner Memos, MD  07/02/20 8087

## 2020-07-02 NOTE — ED TRIAGE NOTES
Patient presents to ED by EMS with complaint of \"walking a long\". Was found by 's deputy, walking bare foot. Patient unkempt.   EMS reports patient has some sort of mental health problem

## 2020-07-03 VITALS
TEMPERATURE: 97.7 F | HEART RATE: 76 BPM | OXYGEN SATURATION: 97 % | BODY MASS INDEX: 30.8 KG/M2 | RESPIRATION RATE: 16 BRPM | HEIGHT: 71 IN | WEIGHT: 220 LBS | SYSTOLIC BLOOD PRESSURE: 122 MMHG | DIASTOLIC BLOOD PRESSURE: 76 MMHG

## 2020-07-03 NOTE — ED NOTES
Called [facility] in [facility], they have open beds. Faxed chart there for review and potential acceptance.      Adolfo Leigh RN  66/04/02 9231

## 2020-07-03 NOTE — ED NOTES
[facility] at bedside for transfer to [facility]. Pt changed to blue gown. Belongings given to [facility].      Markus Gosselin, RN  07/03/20 2055

## 2020-07-03 NOTE — ED NOTES
Pt given healthy choice meal and orange juice, Sitter 1:1 no needs voiced.      Markus Gosselin, RN  07/02/20 8279

## 2020-07-03 NOTE — ED NOTES
Verbal Request from Dr. Karri Mcfarland, ED Phsycian to speak with patient:    Provisional Diagnosis:   Probable Psychosis  History of Bipolar Disorder  Diagnosis of Schizoaffective Disorder  Noncompliance with Treatment and Rx Meds  Cannabis Use per Urine Drug Screen Results    Psychosocial and Contextual Factors:  Per Dr Grayland Spurling Physician:  \"Jon Cleveland is a 30 y.o. male that presents for suicidal ideations, apparently he was walking barefoot and acting bizarre outside, police pulled over and called EMS apparently he has a mental health history and he voiced some thoughts of wanting to harm himself which he also did with nursing staff, to me he is denying this apparently he told nursing staff he does not have any reason to live anymore\"    Per Amber Osler, ED RN:  \"Patient presents to ED by EMS with complaint of \"walking a long\". Was found by [facility]'s deputy, walking bare foot. Patient unkempt. EMS reports patient has some sort of mental health problem\". Per Gideon Trejo, ED RN:  \"Pt states walked over rough terrain for long time and had to go through a creek, states his birthday was yesterday started off good ended badly - was up all night, states believes his marijuana was laced with meth. Pt pupils dilated. Pt unkept. Pt clothes and wallet wet. Pt changed into green gown. Shirt, pants, wallet collected and given to security. Blood work drawn. Pt tearfully states he feels responsible for all of the death that takes place in the ER. States \"just let me go. I just want to walk back to [place], you have sicker patients to take care of.\" Stated drank alcohol last night with his psych meds. \"I have no will to live. \"     Bradley Damon is very bizarre with his facial movements, expressions and in his interactions with me.     Closes his eyes and tenses his facial and hand muscles, then his body shakes, opens his eyes and says he has a,\"back stock of meds at home that I need to take to the pharmacy so they can give them to somebody else. I don't need them, my [place] [employer] doesn't work, that's for drug users and I don't need it. I'm not taking my meds, I haven't seen Dr. Matteo Watkins in a very long time. I have no will to live, I've been walking all day and I'm not telling you who I live with, that's not something you need to know. I drank alcohol with my pills last night\". Looks lost, appears to be responding to internal stimuli. Paranoid, limited trust, doesn't choose to share but very minimal history with me. Thoughts are somewhat racing, very incongruent, loose associations. Armando Garcia is quite unkempt and dirty with body odor. Says he hasn't slept, eaten or taken his meds in at least 2 days. Makes references to self harm but is secretive regarding his plan other than to refer to\"back stock\" with access to Pills / Rx Meds. Renay Carlton is at a high risk for self harm due to the above. - Denies homicidal ideation and plan. - Denies auditory and visual hallucinations. - Poor appetite and poor sleep. - Demonstrates extremely poor insight and poor judgement. Shared history as noted with Dr. Piero Snell, ED Physician, he recommends Psychiatric Admission, Involuntary - [facility] for continued Observation, Evaluation and Safety. Will seek placement. Carri Balderas Summary:     Patient: As above. Family: No history is shared. Agency: [facility] per patient's statements - History there with Dr. Matteo Watkins. .      Present Suicidal Behavior:     Verbal: As above. Attempt: Unknown    Past Suicidal Behavior:     Verbal: Unknown    Attempt: Unknown      Self-Injurious/Self-Mutilation: Long time walking, no shoes or socks, bare feet? Trauma Identified:  Unknown, not shared. Protective Factors:    None are apparent at this time.     Risk Factors:    Probable Psychosis  History of Bipolar Disorder  Diagnosis of Schizoaffective Disorder  Noncompliance with Treatment and Rx Meds  Cannabis Use per Urine Drug Screen Results    Clinical Summary:    As noted. Will seek placement.     Electronically signed by Belia Dwyer RN on 4/8/2899 at 35:89 PM     Belia Dwyer RN  76/73/80 7786

## 2020-07-03 NOTE — ED PROVIDER NOTES
Emergency Department Encounter    Patient: Dmitry Rubio  MRN: [id number]  : 1995  Date of Evaluation: 2020  ED Provider:  Lulu Salazar    Briefly, Dmitry Rubio is a 30 y.o. male presented to the emergency department for suicidal ideation. Patient was medically cleared and awaiting placement. We will continue to monitor while in the emergency department.     I have reviewed and interpreted all of the currently available lab results from this visit (if applicable)  Results for orders placed or performed during the hospital encounter of 20   CBC Auto Differential   Result Value Ref Range    WBC 10.2 4.0 - 10.5 K/CU MM    RBC 4.30 (L) 4.6 - 6.2 M/CU MM    Hemoglobin 14.1 13.5 - 18.0 GM/DL    Hematocrit 41.8 (L) 42 - 52 %    MCV 97.2 78 - 100 FL    MCH 32.8 (H) 27 - 31 PG    MCHC 33.7 32.0 - 36.0 %    RDW 13.5 11.7 - 14.9 %    Platelets 488 696 - 047 K/CU MM    MPV 8.8 7.5 - 11.1 FL    Differential Type AUTOMATED DIFFERENTIAL     Segs Relative 65.0 36 - 66 %    Lymphocytes % 21.6 (L) 24 - 44 %    Monocytes % 10.2 (H) 0 - 4 %    Eosinophils % 2.2 0 - 3 %    Basophils % 0.6 0 - 1 %    Segs Absolute 6.6 K/CU MM    Lymphocytes Absolute 2.2 K/CU MM    Monocytes Absolute 1.0 K/CU MM    Eosinophils Absolute 0.2 K/CU MM    Basophils Absolute 0.1 K/CU MM    Nucleated RBC % 0.0 %    Total Nucleated RBC 0.0 K/CU MM    Total Immature Neutrophil 0.04 K/CU MM    Immature Neutrophil % 0.4 0 - 0.43 %   Comprehensive Metabolic Panel   Result Value Ref Range    Sodium 134 (L) 135 - 145 MMOL/L    Potassium 3.7 3.5 - 5.1 MMOL/L    Chloride 101 99 - 110 mMol/L    CO2 23 21 - 32 MMOL/L    BUN 16 6 - 23 MG/DL    CREATININE 0.9 0.9 - 1.3 MG/DL    Glucose 77 70 - 99 MG/DL    Calcium 9.6 8.3 - 10.6 MG/DL    Alb 4.4 3.4 - 5.0 GM/DL    Total Protein 7.4 6.4 - 8.2 GM/DL    Total Bilirubin 0.7 0.0 - 1.0 MG/DL    ALT 24 10 - 40 U/L    AST 33 15 - 37 IU/L    Alkaline Phosphatase 65 40 - 128 IU/L    GFR Non- American >60 >60 mL/min/1.73m2    GFR African American >60 >60 mL/min/1.73m2    Anion Gap 10 4 - 16   Ethanol   Result Value Ref Range    Alcohol Scrn <0.01 <0.01 %WT/VOL   Urine Drug Screen   Result Value Ref Range    Cannabinoid Scrn, Ur UNCONFIRMED POSITIVE (A) NEGATIVE    Amphetamines NEGATIVE NEGATIVE    Cocaine Metabolite NEGATIVE NEGATIVE    Benzodiazepine Screen, Urine NEGATIVE NEGATIVE    Barbiturate Screen, Ur NEGATIVE NEGATIVE    Opiates, Urine NEGATIVE NEGATIVE    Phencyclidine, Urine NEGATIVE NEGATIVE    Oxycodone NEGATIVE NEGATIVE      Radiographs (if obtained):    [] Radiologist's Report Reviewed:  No orders to display       MDM:      Clinical Impression:  1. Depression, unspecified depression type      Disposition referral (if applicable):  No follow-up provider specified. Disposition medications (if applicable):  New Prescriptions    No medications on file       Comment: Please note this report has been produced using speech recognition software and may contain errors related to that system including errors in grammar, punctuation, and spelling, as well as words and phrases that may be inappropriate. Efforts were made to edit the dictations.        Jasper Martin DO  07/03/20 9451

## 2020-07-03 NOTE — ED NOTES
Pt states walked over rough terrain for long time and had to go through a creek, states his birthday was yesterday started off good ended badly - was up all night, states believes his marijuana was laced with meth. Pt pupils dilated. Pt unkept. Pt clothes and wallet wet. Pt changed into green gown. Shirt, pants, wallet collected and given to security. Blood work drawn. Pt tearfully states he feels responsible for all of the death that takes place in the ER. States \"just let me go. I just want to walk back to [place], you have sicker patients to take care of.\" Stated drank alcohol last night with his psych meds. \"I have no will to live. \"      Hari Collado RN  07/02/20 9700

## 2020-07-23 ENCOUNTER — HOSPITAL ENCOUNTER (EMERGENCY)
Age: 25
Discharge: HOME | End: 2020-07-23
Attending: EMERGENCY MEDICINE
Payer: COMMERCIAL

## 2020-07-23 VITALS
BODY MASS INDEX: 28.98 KG/M2 | SYSTOLIC BLOOD PRESSURE: 130 MMHG | HEART RATE: 89 BPM | HEIGHT: 71 IN | RESPIRATION RATE: 18 BRPM | WEIGHT: 207 LBS | TEMPERATURE: 98.6 F | OXYGEN SATURATION: 96 % | DIASTOLIC BLOOD PRESSURE: 74 MMHG

## 2020-07-23 PROCEDURE — U0002 COVID-19 LAB TEST NON-CDC: HCPCS

## 2020-07-23 PROCEDURE — 99283 EMERGENCY DEPT VISIT LOW MDM: CPT

## 2020-07-23 PROCEDURE — 6370000000 HC RX 637 (ALT 250 FOR IP): Performed by: EMERGENCY MEDICINE

## 2020-07-23 RX ORDER — GUAIFENESIN AND DEXTROMETHORPHAN HYDROBROMIDE 600; 30 MG/1; MG/1
1 TABLET, EXTENDED RELEASE ORAL 2 TIMES DAILY
Qty: 28 TABLET | Refills: 0 | Status: ON HOLD | OUTPATIENT
Start: 2020-07-23 | End: 2020-08-17 | Stop reason: HOSPADM

## 2020-07-23 RX ORDER — ACETAMINOPHEN 325 MG/1
650 TABLET ORAL EVERY 6 HOURS PRN
Qty: 120 TABLET | Refills: 3 | Status: ON HOLD | OUTPATIENT
Start: 2020-07-23 | End: 2020-08-17 | Stop reason: HOSPADM

## 2020-07-23 RX ORDER — ACETAMINOPHEN 325 MG/1
650 TABLET ORAL ONCE
Status: COMPLETED | OUTPATIENT
Start: 2020-07-23 | End: 2020-07-23

## 2020-07-23 RX ADMIN — ACETAMINOPHEN 650 MG: 325 TABLET ORAL at 23:43

## 2020-07-23 ASSESSMENT — PAIN SCALES - GENERAL: PAINLEVEL_OUTOF10: 0

## 2020-07-24 ENCOUNTER — CARE COORDINATION (OUTPATIENT)
Dept: CARE COORDINATION | Age: 25
End: 2020-07-24

## 2020-07-24 ENCOUNTER — HOSPITAL ENCOUNTER (EMERGENCY)
Age: 25
Discharge: HOME | End: 2020-07-24
Payer: COMMERCIAL

## 2020-07-24 VITALS — OXYGEN SATURATION: 99 % | RESPIRATION RATE: 17 BRPM | TEMPERATURE: 97.7 F

## 2020-07-24 PROCEDURE — 99283 EMERGENCY DEPT VISIT LOW MDM: CPT

## 2020-07-24 ASSESSMENT — PAIN - FUNCTIONAL ASSESSMENT: PAIN_FUNCTIONAL_ASSESSMENT: 0-10

## 2020-07-24 ASSESSMENT — PAIN SCALES - GENERAL: PAINLEVEL_OUTOF10: 10

## 2020-07-24 NOTE — ED TRIAGE NOTES
Pt to the ED for c/o cough, and chills intermittently and voices concern for COVID-19 even though he reports no known contact with anyone who has been infected to said virus. Pt vital signs stable no signs of distress noted and pt denies any pain.

## 2020-07-24 NOTE — ED NOTES
Pt given gripper socks , he has no shoes , left his d/c paper work in the  took them out to the  incase he come back and wants them      Juan J Barrios RN  07/24/20 1379

## 2020-07-24 NOTE — ED PROVIDER NOTES
week: Not on file     Minutes per session: Not on file    Stress: Not on file   Relationships    Social connections     Talks on phone: Not on file     Gets together: Not on file     Attends Caoda[employer] service: Not on file     Active member of club or organization: Not on file     Attends meetings of clubs or organizations: Not on file     Relationship status: Not on file    Intimate partner violence     Fear of current or ex partner: Not on file     Emotionally abused: Not on file     Physically abused: Not on file     Forced sexual activity: Not on file   Other Topics Concern    Not on file   Social History Narrative    ** Merged History Encounter **          No current facility-administered medications for this encounter. Current Outpatient Medications   Medication Sig Dispense Refill    Dextromethorphan-guaiFENesin (MUCINEX DM)  MG TB12 Take 1 tablet by mouth 2 times daily 28 tablet 0    acetaminophen (AMINOFEN) 325 MG tablet Take 2 tablets by mouth every 6 hours as needed for Pain 120 tablet 3    ketorolac (TORADOL) 10 MG tablet Take 1 tablet by mouth every 6 hours as needed for Pain 20 tablet 0    tamsulosin (FLOMAX) 0.4 MG capsule Take 1 capsule by mouth daily for 5 days 5 capsule 0    busPIRone (BUSPAR) 10 MG tablet Take 10 mg by mouth 3 times daily One in the morning and two before bed      HydrOXYzine Pamoate (VISTARIL PO) Take by mouth nightly      melatonin 3 MG TABS tablet Take 3 mg by mouth nightly as needed      Fluticasone Furoate-Vilanterol (BREO ELLIPTA IN) Inhale into the lungs      albuterol sulfate HFA (PROVENTIL HFA) 108 (90 Base) MCG/ACT inhaler Inhale 2 puffs into the lungs every 4 hours as needed for Wheezing or Shortness of Breath With spacer (and mask if indicated). Thanks. 1 Inhaler 1    Respiratory Therapy Supplies (NEBULIZER COMPRESSOR) KIT 1 kit by Does not apply route once for 1 dose.  1 kit 0    ipratropium (ATROVENT) 0.02 % nebulizer solution Take 2.5 mLs by nebulization 4 times daily. 50 mL 1    traZODone (DESYREL) 50 MG tablet Take 50 mg by mouth nightly.  ziprasidone (GEODON) 80 MG capsule Take 60 mg by mouth nightly. Allergies   Allergen Reactions    Latex Itching    Chantix [Varenicline] Other (See Comments)     Causes suicidal thoughts    Seasonal Other (See Comments)     Dust mites, pollen, weed, etc - \"it makes my asthma act up\"       Nursing Notes Reviewed    Physical Exam:  ED Triage Vitals [07/24/20 0209]   Enc Vitals Group      BP       Pulse       Resp 17      Temp 97.7 °F (36.5 °C)      Temp Source Oral      SpO2 99 %      Weight       Height       Head Circumference       Peak Flow       Pain Score       Pain Loc       Pain Edu? Excl. in [address]? General :Patient is awake alert oriented person place and time no acute distress nontoxic appearing  HEENT: Pupils are equally round and reactive to light extraocular motors are intact conjunctivae clear sclerae white there is no injection no icterus. Nose without any rhinorrhea or epistaxis. Oral mucosa is moist no exudate buccal mucosa shows no ulcerations. Uvula is midline    Neck: Neck is supple full range of motion trachea midline thyroid nonpalpable  Cardiac: Heart regular rate rhythm no murmurs rubs clicks or gallops  Lungs: Lungs are clear to auscultation there is no wheezing rhonchi or rales. There is no use of accessory muscles no nasal flaring identified. Chest wall: There is no tenderness to palpation over the chest wall or over ribs  Abdomen: Abdomen is soft nontender nondistended. There is no firm or pulsatile masses no rebound rigidity or guarding negative Prater's negative McBurney, no peritoneal signs  Suprapubic:  there is no tenderness to palpation over the external bladder   Musculoskeletal: 5 out of 5 strength in all 4 extremities full flexion extension abduction and adduction supination pronation of all extremities and all digits. No obvious muscle atrophy is noted. No focal muscle deficits are appreciated  Dermatology: Skin is warm and dry there is no obvious abscesses lacerations or lesions noted  Psych: Mentation is grossly normal cognition is grossly normal. Affect is appropriate  Neuro: Motor intact sensory intact cranial nerves II through XII are intact level of consciousness is normal cerebellar function is normal reflexes are grossly normal. No evidence of incontinence or loss of bowel or bladder no saddle anesthesia noted Lymphatic: There is no submandibular or cervical adenopathy appreciated. I have reviewed and interpreted all of the currently available lab results from this visit (if applicable):  No results found for this visit on 07/24/20. Radiographs (if obtained):  [] The following radiograph was interpreted by myself in the absence of a radiologist:   [] Radiologist's Report Reviewed:  No orders to display       EKG (if obtained):   Please See Note of attending physician for EKG interpretation. Chart review shows recent radiograph(s):  Ct Abdomen Pelvis Wo Contrast Additional Contrast? None    Result Date: 6/24/2020  EXAMINATION: CT OF THE ABDOMEN AND PELVIS WITHOUT CONTRAST 6/24/2020 4:07 am TECHNIQUE: CT of the abdomen and pelvis was performed without the administration of intravenous contrast. Multiplanar reformatted images are provided for review. Dose modulation, iterative reconstruction, and/or weight based adjustment of the mA/kV was utilized to reduce the radiation dose to as low as reasonably achievable. COMPARISON: None. HISTORY: ORDERING SYSTEM PROVIDED HISTORY: Pain, Flank TECHNOLOGIST PROVIDED HISTORY: Reason for exam:->Pain, Flank Additional Contrast?->None Reason for Exam: bilateral flank pain Acuity: Acute Type of Exam: Initial FINDINGS: Lower Chest: The lung bases are well aerated. Pleural surfaces are unremarkable and no evidence of pleural effusion is identified.  Organs: Bilateral renal collecting system scattered subtle punctate calculi are seen without associated urinary obstruction identified. The liver, gallbladder, spleen, pancreas, adrenal glands, kidneys, are otherwise unremarkable in appearance. GI/Bowel: Medial suture radiodense material at the medial cecum suggests appendectomy. The stomach is unremarkable without wall thickening or distention. Bowel loops are unremarkable in appearance without evidence of obstruction, distension or mucosal thickening. Pelvis: The urinary bladder is well distended and unremarkable in appearance. No evidence of pelvic free fluid is seen. Peritoneum/Retroperitoneum: No evidence of retroperitoneal or intraperitoneal lymphadenopathy is identified. No evidence of intraperitoneal free fluid is seen. Bones/Soft Tissues: The bones, skeletal muscle bundles, fascial planes and subcutaneous soft tissues are unremarkable in appearance. 1. Bilateral renal collecting system subtle scattered sub mm punctate calculi without associated urinary obstruction. 2. Otherwise, unremarkable noncontrast CT abdomen and pelvis examination. MDM:   Patient presents today to the emergency department because police found him walking down the street. He has no complaints whatsoever was [facility] and will be discharged home vital signs are stable here     I independently managed patient today in the ED      Temp 97.7 °F (36.5 °C) (Oral)   Resp 17   SpO2 99%       Clinical Impression:  1. Well adult health check        Disposition referral (if applicable): Anastasia Ivy MD  [facility]     In 2 days      Disposition medications (if applicable):  New Prescriptions    No medications on file         Comment: Please note this report has been produced using speech recognition software and may contain errors related to that system including errors in grammar, punctuation, and spelling, as well as words and phrases that may be inappropriate.  If there are any questions or concerns please feel free to contact the dictating provider for clarification.       [facility], [address] [place] [address]  07/24/20 9501

## 2020-07-24 NOTE — ED PROVIDER NOTES
Triage Chief Complaint:   Cough (pt reports intermittent cough, chill, and possible fever. Pt wants testing for COVID-19)    [place]:  Aarti Rdz is a 30 y.o. male that presents with concern for coronavirus. Patient reports \"I think I might have it\". Patient has had a cough and some intermittent chills for the past day. Patient does not know of any known sick contacts but has [place] several people's hands\". Patient's cough is mostly nonproductive. No wheezing. Patient denies any chest pain or shortness of breath. No nausea or vomiting. No diarrhea. Patient is tolerating normal diet. Patient is a smoker but is trying to quit. ROS:  General:  No fevers, + chills  ENT: No sore throat, no runny nose  Cardiovascular:  No chest pain, no palpitations  Respiratory:  No shortness of breath, + cough  Gastrointestinal:  No pain, no nausea, no vomiting, no diarrhea  : No pain with urinating, no increased frequency urinating  Neurologic:  No numbness, no weakness  Extremities:  No edema, no pain  Skin:  No rash  Psych: No axienty    Past Medical History:   Diagnosis Date    Arachnoid cyst     Asthma     Eczema     GERD (gastroesophageal reflux disease)     Mood disorder, drug-induced ([facility], [place] [address]     Suicidal ideation      Past Surgical History:   Procedure Laterality Date    APPENDECTOMY      BRAIN SURGERY       History reviewed. No pertinent family history.   Social History     Socioeconomic History    Marital status: Single     Spouse name: Not on file    Number of children: Not on file    Years of education: Not on file    Highest education level: Not on file   Occupational History    Not on file   Social Needs    Financial resource strain: Not on file    Food insecurity     Worry: Not on file     Inability: Not on file    Transportation needs     Medical: Not on file     Non-medical: Not on file   Tobacco Use    Smoking status: Current Every Day Smoker     Packs/day: 2.00     Types: Cigarettes    Smokeless tobacco: Never Used   Substance and Sexual Activity    Alcohol use: Yes     Comment: occ    Drug use: Not Currently     Frequency: 5.0 times per week     Types: Marijuana     Comment: States MJ may have been laced with meth    Sexual activity: Never   Lifestyle    Physical activity     Days per week: Not on file     Minutes per session: Not on file    Stress: Not on file   Relationships    Social connections     Talks on phone: Not on file     Gets together: Not on file     Attends Yarsani service: Not on file     Active member of club or organization: Not on file     Attends meetings of clubs or organizations: Not on file     Relationship status: Not on file    Intimate partner violence     Fear of current or ex partner: Not on file     Emotionally abused: Not on file     Physically abused: Not on file     Forced sexual activity: Not on file   Other Topics Concern    Not on file   Social History Narrative    ** Merged History Encounter **          Current Facility-Administered Medications   Medication Dose Route Frequency Provider Last Rate Last Dose    acetaminophen (TYLENOL) tablet 650 mg  650 mg Oral Once Prince Olmedo MD         Current Outpatient Medications   Medication Sig Dispense Refill    Dextromethorphan-guaiFENesin (MUCINEX DM)  MG TB12 Take 1 tablet by mouth 2 times daily 28 tablet 0    acetaminophen (AMINOFEN) 325 MG tablet Take 2 tablets by mouth every 6 hours as needed for Pain 120 tablet 3    ketorolac (TORADOL) 10 MG tablet Take 1 tablet by mouth every 6 hours as needed for Pain 20 tablet 0    tamsulosin (FLOMAX) 0.4 MG capsule Take 1 capsule by mouth daily for 5 days 5 capsule 0    busPIRone (BUSPAR) 10 MG tablet Take 10 mg by mouth 3 times daily One in the morning and two before bed      HydrOXYzine Pamoate (VISTARIL PO) Take by mouth nightly      melatonin 3 MG TABS tablet Take 3 mg by mouth nightly as needed      Fluticasone Furoate-Vilanterol (BREO ELLIPTA IN) Inhale into the lungs      albuterol sulfate HFA (PROVENTIL HFA) 108 (90 Base) MCG/ACT inhaler Inhale 2 puffs into the lungs every 4 hours as needed for Wheezing or Shortness of Breath With spacer (and mask if indicated). Thanks. 1 Inhaler 1    Respiratory Therapy Supplies (NEBULIZER COMPRESSOR) KIT 1 kit by Does not apply route once for 1 dose. 1 kit 0    ipratropium (ATROVENT) 0.02 % nebulizer solution Take 2.5 mLs by nebulization 4 times daily. 50 mL 1    traZODone (DESYREL) 50 MG tablet Take 50 mg by mouth nightly.  ziprasidone (GEODON) 80 MG capsule Take 60 mg by mouth nightly. Allergies   Allergen Reactions    Latex Itching    Chantix [Varenicline] Other (See Comments)     Causes suicidal thoughts    Seasonal Other (See Comments)     Dust mites, pollen, weed, etc - \"it makes my asthma act up\"       Nursing Notes Reviewed    Physical Exam:  ED Triage Vitals [07/23/20 2325]   Enc Vitals Group      /74      Pulse 89      Resp 18      Temp 98.6 °F (37 °C)      Temp Source Oral      SpO2 96 %      Weight 207 lb (93.9 kg)      Height 5' 11\" (1.803 m)      Head Circumference       Peak Flow       Pain Score       Pain Loc       Pain Edu? Excl. in [address]? My pulse ox interpretation is - normal    General appearance:  No acute distress. Sitting comfortably in bed. Appears overall nontoxic. Skin:  Warm. Dry. No diaphoresis. Eye:  Extraocular movements intact. Ears, nose, mouth and throat:  Oral mucosa moist   Extremity:  No swelling. Normal ROM     Heart:  Regular rate and rhythm, normal S1 & S2, no extra heart sounds. Abdomen:  Soft. Nontender. Nondistended. No rebound or guarding. Respiratory:  Lungs clear to auscultation bilaterally. Respirations nonlabored. Speaking clearly in full sentences. No respiratory distress. Neurological:  Alert and oriented times 3. No focal neuro deficits.                 I have reviewed and interpreted all of the currently available lab results from this visit (if applicable):  No results found for this visit on 07/23/20. Radiographs (if obtained):  [] The following radiograph was interpreted by myself in the absence of a radiologist:   [] Radiologist's Report Reviewed:  No orders to display         EKG (if obtained): (All EKG's are interpreted by myself in the absence of a cardiologist)    Chart review shows recent radiographs:  Ct Abdomen Pelvis Wo Contrast Additional Contrast? None    Result Date: 6/24/2020  EXAMINATION: CT OF THE ABDOMEN AND PELVIS WITHOUT CONTRAST 6/24/2020 4:07 am TECHNIQUE: CT of the abdomen and pelvis was performed without the administration of intravenous contrast. Multiplanar reformatted images are provided for review. Dose modulation, iterative reconstruction, and/or weight based adjustment of the mA/kV was utilized to reduce the radiation dose to as low as reasonably achievable. COMPARISON: None. HISTORY: ORDERING SYSTEM PROVIDED HISTORY: Pain, Flank TECHNOLOGIST PROVIDED HISTORY: Reason for exam:->Pain, Flank Additional Contrast?->None Reason for Exam: bilateral flank pain Acuity: Acute Type of Exam: Initial FINDINGS: Lower Chest: The lung bases are well aerated. Pleural surfaces are unremarkable and no evidence of pleural effusion is identified. Organs: Bilateral renal collecting system scattered subtle punctate calculi are seen without associated urinary obstruction identified. The liver, gallbladder, spleen, pancreas, adrenal glands, kidneys, are otherwise unremarkable in appearance. GI/Bowel: Medial suture radiodense material at the medial cecum suggests appendectomy. The stomach is unremarkable without wall thickening or distention. Bowel loops are unremarkable in appearance without evidence of obstruction, distension or mucosal thickening. Pelvis: The urinary bladder is well distended and unremarkable in appearance. No evidence of pelvic free fluid is seen. Peritoneum/Retroperitoneum: No evidence of retroperitoneal or intraperitoneal lymphadenopathy is identified. No evidence of intraperitoneal free fluid is seen. Bones/Soft Tissues: The bones, skeletal muscle bundles, fascial planes and subcutaneous soft tissues are unremarkable in appearance. 1. Bilateral renal collecting system subtle scattered sub mm punctate calculi without associated urinary obstruction. 2. Otherwise, unremarkable noncontrast CT abdomen and pelvis examination. MDM:  Pt presents as above. Emergent conditions considered. Presentation prompted initial COVID swab. Patient treated with Tylenol. Patient is with stable vital signs and appears overall well on my exam.  No red flag features prompting more emergent work-up. Symptomatic treatment with Tylenol and Mucinex DM. Smoking cessation counseling provided. I discussed specific signs and symptoms and when to return to the emergency department as well as need for close outpatient follow-up. Questions sought and answered with the patient. They voice understanding and agree with plan. Clinical Impression:  1. Cough      Disposition referral (if applicable): Billie Ahmadi MD  [address] [place] [address]  [phone]    Schedule an appointment as soon as possible for a visit       [address]  [phone]  Today  If symptoms worsen    Disposition medications (if applicable):  New Prescriptions    ACETAMINOPHEN (AMINOFEN) 325 MG TABLET    Take 2 tablets by mouth every 6 hours as needed for Pain    DEXTROMETHORPHAN-GUAIFENESIN (MUCINEX DM)  MG TB12    Take 1 tablet by mouth 2 times daily       Comment: Please note this report has been produced using speech recognition software and may contain errors related to that system including errors in grammar, punctuation, and spelling, as well as words and phrases that may be inappropriate.  If there are any questions or concerns please feel free to contact the dictating provider for clarification.          Zuleika Espinal MD  07/23/20 0729

## 2020-07-25 LAB
SARS-COV-2: NOT DETECTED
SOURCE: NORMAL

## 2020-07-28 ENCOUNTER — HOSPITAL ENCOUNTER (EMERGENCY)
Age: 25
Discharge: HOME | End: 2020-07-28
Attending: EMERGENCY MEDICINE
Payer: COMMERCIAL

## 2020-07-28 VITALS
DIASTOLIC BLOOD PRESSURE: 81 MMHG | BODY MASS INDEX: 28.98 KG/M2 | HEART RATE: 89 BPM | HEIGHT: 71 IN | OXYGEN SATURATION: 97 % | RESPIRATION RATE: 16 BRPM | WEIGHT: 207 LBS | TEMPERATURE: 97.6 F | SYSTOLIC BLOOD PRESSURE: 138 MMHG

## 2020-07-28 PROCEDURE — 99283 EMERGENCY DEPT VISIT LOW MDM: CPT

## 2020-07-28 ASSESSMENT — ENCOUNTER SYMPTOMS
EYES NEGATIVE: 1
ALLERGIC/IMMUNOLOGIC NEGATIVE: 1
RESPIRATORY NEGATIVE: 1
GASTROINTESTINAL NEGATIVE: 1

## 2020-07-28 NOTE — ED PROVIDER NOTES
[address] [facility]      TRIAGE CHIEF COMPLAINT:   Other (smoked weed)      [place]:  Marcial Geller is a 30 y.o. male that presents with complaint of fatigue, anxiety. Patient states he smoked marijuana last night around midnight it was probably laced with fentanyl. He states after this he did not feel well. He states he is anxious but also very tired. He feels like fentanyl speeds him up. He denies any SI HI. He does have a history of psychiatric problems. He is requesting a place to stay he states he is homeless also have a home go to. He denies any fevers nausea vomiting chest pain shortness of breath abdominal pain SI HI. Denies any trauma. States he wants the drugs out of his system. Denies other questions or concerns. REVIEW OF SYSTEMS:  At least 10 systems reviewed and otherwise acutely negative except as in the [address] Review of Systems   Constitutional: Positive for fatigue. HENT: Negative. Eyes: Negative. Respiratory: Negative. Cardiovascular: Negative. Gastrointestinal: Negative. Endocrine: Negative. Genitourinary: Negative. Musculoskeletal: Negative. Skin: Negative. Allergic/Immunologic: Negative. Neurological: Negative. Hematological: Negative. Psychiatric/Behavioral: The patient is nervous/anxious. All other systems reviewed and are negative. Past Medical History:   Diagnosis Date    Arachnoid cyst     Asthma     Eczema     GERD (gastroesophageal reflux disease)     Mood disorder, drug-induced ([address]     Suicidal ideation      Past Surgical History:   Procedure Laterality Date    APPENDECTOMY      BRAIN SURGERY       No family history on file.   Social History     Socioeconomic History    Marital status: Single     Spouse name: Not on file    Number of children: Not on file    Years of education: Not on file    Highest education level: Not on file   Occupational History    Not on file   Social Needs    Financial Pamoate (VISTARIL PO) Take by mouth nightly      melatonin 3 MG TABS tablet Take 3 mg by mouth nightly as needed      Fluticasone Furoate-Vilanterol (BREO ELLIPTA IN) Inhale into the lungs      albuterol sulfate HFA (PROVENTIL HFA) 108 (90 Base) MCG/ACT inhaler Inhale 2 puffs into the lungs every 4 hours as needed for Wheezing or Shortness of Breath With spacer (and mask if indicated). Thanks. 1 Inhaler 1    Respiratory Therapy Supplies (NEBULIZER COMPRESSOR) KIT 1 kit by Does not apply route once for 1 dose. 1 kit 0    ipratropium (ATROVENT) 0.02 % nebulizer solution Take 2.5 mLs by nebulization 4 times daily. 50 mL 1    traZODone (DESYREL) 50 MG tablet Take 50 mg by mouth nightly.  ziprasidone (GEODON) 80 MG capsule Take 60 mg by mouth nightly. Allergies   Allergen Reactions    Latex Itching    Chantix [Varenicline] Other (See Comments)     Causes suicidal thoughts    Seasonal Other (See Comments)     Dust mites, pollen, weed, etc - \"it makes my asthma act up\"     No current facility-administered medications for this encounter.       Current Outpatient Medications   Medication Sig Dispense Refill    Dextromethorphan-guaiFENesin (MUCINEX DM)  MG TB12 Take 1 tablet by mouth 2 times daily 28 tablet 0    acetaminophen (AMINOFEN) 325 MG tablet Take 2 tablets by mouth every 6 hours as needed for Pain 120 tablet 3    ketorolac (TORADOL) 10 MG tablet Take 1 tablet by mouth every 6 hours as needed for Pain 20 tablet 0    tamsulosin (FLOMAX) 0.4 MG capsule Take 1 capsule by mouth daily for 5 days 5 capsule 0    busPIRone (BUSPAR) 10 MG tablet Take 10 mg by mouth 3 times daily One in the morning and two before bed      HydrOXYzine Pamoate (VISTARIL PO) Take by mouth nightly      melatonin 3 MG TABS tablet Take 3 mg by mouth nightly as needed      Fluticasone Furoate-Vilanterol (BREO ELLIPTA IN) Inhale into the lungs      albuterol sulfate HFA (PROVENTIL HFA) 108 (90 Base) MCG/ACT inhaler Inhale 2 puffs into the lungs every 4 hours as needed for Wheezing or Shortness of Breath With spacer (and mask if indicated). Thanks. 1 Inhaler 1    Respiratory Therapy Supplies (NEBULIZER COMPRESSOR) KIT 1 kit by Does not apply route once for 1 dose. 1 kit 0    ipratropium (ATROVENT) 0.02 % nebulizer solution Take 2.5 mLs by nebulization 4 times daily. 50 mL 1    traZODone (DESYREL) 50 MG tablet Take 50 mg by mouth nightly.  ziprasidone (GEODON) 80 MG capsule Take 60 mg by mouth nightly. Nursing Notes Reviewed    VITAL SIGNS:  ED Triage Vitals   Enc Vitals Group      BP 07/28/20 0550 (!) 140/79      Pulse 07/28/20 0539 76      Resp 07/28/20 0550 16      Temp 07/28/20 0550 97.6 °F (36.4 °C)      Temp Source 07/28/20 0550 Oral      SpO2 07/28/20 0550 99 %      Weight 07/28/20 0550 207 lb (93.9 kg)      Height 07/28/20 0550 5' 11\" (1.803 m)      Head Circumference --       Peak Flow --       Pain Score --       Pain Loc --       Pain Edu? --       Excl. in [address]? --        PHYSICAL EXAM:  Physical Exam  Vitals signs and nursing note reviewed. Constitutional:       General: He is not in acute distress. Appearance: Normal appearance. He is well-developed and well-groomed. He is not ill-appearing, toxic-appearing or diaphoretic. HENT:      Head: Normocephalic and atraumatic. Right Ear: External ear normal.      Left Ear: External ear normal.      Nose: No congestion or rhinorrhea. Eyes:      General: No scleral icterus. Right eye: No discharge. Left eye: No discharge. Extraocular Movements: Extraocular movements intact. Conjunctiva/sclera: Conjunctivae normal.      Pupils: Pupils are equal, round, and reactive to light. Neck:      Musculoskeletal: Full passive range of motion without pain and normal range of motion. Normal range of motion. No edema, erythema, neck rigidity, crepitus, injury or pain with movement. Vascular: No JVD.       Trachea: Phonation normal.   Cardiovascular:      Rate and Rhythm: Normal rate and regular rhythm. Pulses: Normal pulses. Heart sounds: Normal heart sounds. No murmur. No friction rub. No gallop. Pulmonary:      Effort: Pulmonary effort is normal. No respiratory distress. Breath sounds: Normal breath sounds. No stridor. No wheezing, rhonchi or rales. Abdominal:      General: Bowel sounds are normal. There is no distension. Palpations: Abdomen is soft. There is no mass or pulsatile mass. Tenderness: There is no abdominal tenderness. There is no guarding or rebound. Negative signs include Prater's sign, Rovsing's sign and McBurney's sign. Hernia: No hernia is present. Musculoskeletal: Normal range of motion. General: No swelling, tenderness, deformity or signs of injury. Right lower leg: No edema. Left lower leg: No edema. Skin:     General: Skin is warm. Coloration: Skin is not jaundiced or pale. Findings: No bruising, erythema, lesion or rash. Neurological:      General: No focal deficit present. Mental Status: He is alert and oriented to person, place, and time. GCS: GCS eye subscore is 4. GCS verbal subscore is 5. GCS motor subscore is 6. Cranial Nerves: Cranial nerves are intact. No cranial nerve deficit, dysarthria or facial asymmetry. Sensory: Sensation is intact. No sensory deficit. Motor: Motor function is intact. No weakness, tremor, atrophy, abnormal muscle tone or seizure activity. Coordination: Coordination is intact. Coordination normal.   Psychiatric:         Attention and Perception: Attention and perception normal.         Mood and Affect: Mood and affect normal.         Speech: Speech normal.         Behavior: Behavior normal. Behavior is cooperative. Thought Content:  Thought content normal.         Cognition and Memory: Cognition and memory normal.         Judgment: Judgment normal.           I have reviewed andinterpreted all of the currently available lab results from this visit (if applicable):    No results found for this visit on 07/28/20. Radiographs (if obtained):  [] The following radiograph was interpreted by myself in the absence of a radiologist:  [x] Radiologist's Report Reviewed:      EKG (if obtained): (All EKG's are interpreted by myself in the absence of a cardiologist)    MDM:    Patient here with marijuana use possible fentanyl use and homeless. Again patient states he smoked marijuana last night around midnight that was laced with fentanyl. Patient did not pass out he denies any Narcan use. Patient appears well he states he is tired. He denies any fevers nausea vomiting chest pain shortness of breath SI HI. He states he wants the drugs out of his system and states he does have a history of psychiatric problems and he wants those to be gone as well. I advised he needs to see a psychiatrist to adjust his medications he has no SI HI he is otherwise well-appearing his vital signs are completely stable if he smoked marijuana laced with fentanyl last night at midnight. Is now 6 AM.  He has no other complaints he does request a place to stay as he is homeless he also asked me for a job here. Patient appears well he is in no distress I do not think any additional labs or imaging or observation he does not need Narcan patient stable discharged home given return precautions and follow-up information. I do not have case management here at this time. I did advise checking into a homeless shelter. I do not think is ACS or DVT or PE or AAA or other emergency at this time. CLINICAL IMPRESSION:  Final diagnoses:   Marijuana use   Homeless       (Please note that portions of this note may have been completed with a voice recognition program. Efforts were made to edit the dictations but occasionally words aremis-transcribed.)    DISPOSITION REFERRAL (if applicable):   Phani Zimmerman MD  [address] [place] [address] Dr Joellen Pop [id number]  [facility]  [place] [id number]  [phone]    If symptoms worsen    [facility] - ADULT  [address]  [address]  (if applicable):  Discharge Medication List as of 7/28/2020  6:40 AM             Elizabeth Thomas, DO Elizabeth Thomas, DO  07/28/20 7077

## 2020-07-31 ENCOUNTER — CARE COORDINATION (OUTPATIENT)
Dept: CARE COORDINATION | Age: 25
End: 2020-07-31

## 2020-08-07 ENCOUNTER — CARE COORDINATION (OUTPATIENT)
Dept: CARE COORDINATION | Age: 25
End: 2020-08-07

## 2020-08-07 NOTE — CARE COORDINATION
Call to pt for 2 wk ed f/u, no answer, no vm. No further outreach scheduled with this CTN/ACM. Episode of Care resolved. Patient has this CTN/ACM contact information if future needs arise.

## 2020-08-11 ENCOUNTER — APPOINTMENT (OUTPATIENT)
Dept: GENERAL RADIOLOGY | Age: 25
End: 2020-08-11
Payer: COMMERCIAL

## 2020-08-11 ENCOUNTER — HOSPITAL ENCOUNTER (EMERGENCY)
Age: 25
Discharge: PSYCHIATRIC OR SUBSTANCE USE FACILITY | End: 2020-08-11
Attending: EMERGENCY MEDICINE
Payer: COMMERCIAL

## 2020-08-11 ENCOUNTER — HOSPITAL ENCOUNTER (INPATIENT)
Age: 25
LOS: 6 days | Discharge: HOME | DRG: 751 | End: 2020-08-17
Attending: PSYCHIATRY & NEUROLOGY | Admitting: PSYCHIATRY & NEUROLOGY
Payer: COMMERCIAL

## 2020-08-11 ENCOUNTER — HOSPITAL ENCOUNTER (EMERGENCY)
Age: 25
Discharge: HOME | End: 2020-08-11
Attending: EMERGENCY MEDICINE
Payer: COMMERCIAL

## 2020-08-11 VITALS
HEART RATE: 75 BPM | OXYGEN SATURATION: 95 % | SYSTOLIC BLOOD PRESSURE: 130 MMHG | BODY MASS INDEX: 28 KG/M2 | TEMPERATURE: 98.6 F | DIASTOLIC BLOOD PRESSURE: 68 MMHG | WEIGHT: 200 LBS | RESPIRATION RATE: 14 BRPM | HEIGHT: 71 IN

## 2020-08-11 VITALS
BODY MASS INDEX: 25.9 KG/M2 | HEART RATE: 73 BPM | RESPIRATION RATE: 13 BRPM | TEMPERATURE: 97.8 F | OXYGEN SATURATION: 93 % | SYSTOLIC BLOOD PRESSURE: 102 MMHG | HEIGHT: 71 IN | WEIGHT: 185 LBS | DIASTOLIC BLOOD PRESSURE: 57 MMHG

## 2020-08-11 PROBLEM — F29 PSYCHOSIS (HCC): Status: ACTIVE | Noted: 2020-08-11

## 2020-08-11 LAB
A/G RATIO: 1.6 (ref 1.1–2.2)
ACETAMINOPHEN LEVEL: <5 UG/ML (ref 10–30)
ALBUMIN SERPL-MCNC: 4.6 G/DL (ref 3.4–5)
ALP BLD-CCNC: 81 U/L (ref 40–129)
ALT SERPL-CCNC: 30 U/L (ref 10–40)
AMPHETAMINE SCREEN, URINE: ABNORMAL
ANION GAP SERPL CALCULATED.3IONS-SCNC: 10 MMOL/L (ref 3–16)
AST SERPL-CCNC: 32 U/L (ref 15–37)
BARBITURATE SCREEN URINE: ABNORMAL
BENZODIAZEPINE SCREEN, URINE: ABNORMAL
BILIRUB SERPL-MCNC: 0.8 MG/DL (ref 0–1)
BILIRUBIN URINE: NEGATIVE
BLOOD, URINE: NEGATIVE
BUN BLDV-MCNC: 20 MG/DL (ref 7–20)
CALCIUM SERPL-MCNC: 9.6 MG/DL (ref 8.3–10.6)
CANNABINOID SCREEN URINE: POSITIVE
CHLORIDE BLD-SCNC: 100 MMOL/L (ref 99–110)
CLARITY: CLEAR
CO2: 25 MMOL/L (ref 21–32)
COCAINE METABOLITE SCREEN URINE: ABNORMAL
COLOR: YELLOW
CREAT SERPL-MCNC: 0.8 MG/DL (ref 0.9–1.3)
ETHANOL: NORMAL MG/DL (ref 0–0.08)
GFR AFRICAN AMERICAN: >60
GFR NON-AFRICAN AMERICAN: >60
GLOBULIN: 2.8 G/DL
GLUCOSE BLD-MCNC: 107 MG/DL (ref 70–99)
GLUCOSE URINE: NEGATIVE MG/DL
HCT VFR BLD CALC: 41.3 % (ref 40.5–52.5)
HEMOGLOBIN: 14 G/DL (ref 13.5–17.5)
KETONES, URINE: 15 MG/DL
LEUKOCYTE ESTERASE, URINE: NEGATIVE
Lab: ABNORMAL
MCH RBC QN AUTO: 32.9 PG (ref 26–34)
MCHC RBC AUTO-ENTMCNC: 34 G/DL (ref 31–36)
MCV RBC AUTO: 96.9 FL (ref 80–100)
METHADONE SCREEN, URINE: ABNORMAL
MICROSCOPIC EXAMINATION: ABNORMAL
NITRITE, URINE: NEGATIVE
OPIATE SCREEN URINE: ABNORMAL
OXYCODONE URINE: ABNORMAL
PDW BLD-RTO: 13.5 % (ref 12.4–15.4)
PH UA: 6.5
PH UA: 6.5 (ref 5–8)
PHENCYCLIDINE SCREEN URINE: ABNORMAL
PLATELET # BLD: 299 K/UL (ref 135–450)
PMV BLD AUTO: 7.1 FL (ref 5–10.5)
POTASSIUM REFLEX MAGNESIUM: 4 MMOL/L (ref 3.5–5.1)
PROPOXYPHENE SCREEN: ABNORMAL
PROTEIN UA: NEGATIVE MG/DL
RBC # BLD: 4.27 M/UL (ref 4.2–5.9)
SALICYLATE, SERUM: <0.3 MG/DL (ref 15–30)
SARS-COV-2, NAAT: NOT DETECTED
SODIUM BLD-SCNC: 135 MMOL/L (ref 136–145)
SPECIFIC GRAVITY UA: 1.02 (ref 1–1.03)
TOTAL PROTEIN: 7.4 G/DL (ref 6.4–8.2)
URINE TYPE: ABNORMAL
UROBILINOGEN, URINE: 1 E.U./DL
WBC # BLD: 11.7 K/UL (ref 4–11)

## 2020-08-11 PROCEDURE — 85027 COMPLETE CBC AUTOMATED: CPT

## 2020-08-11 PROCEDURE — 6370000000 HC RX 637 (ALT 250 FOR IP): Performed by: NURSE PRACTITIONER

## 2020-08-11 PROCEDURE — 80307 DRUG TEST PRSMV CHEM ANLYZR: CPT

## 2020-08-11 PROCEDURE — G0480 DRUG TEST DEF 1-7 CLASSES: HCPCS

## 2020-08-11 PROCEDURE — 80053 COMPREHEN METABOLIC PANEL: CPT

## 2020-08-11 PROCEDURE — 6370000000 HC RX 637 (ALT 250 FOR IP): Performed by: EMERGENCY MEDICINE

## 2020-08-11 PROCEDURE — 99282 EMERGENCY DEPT VISIT SF MDM: CPT

## 2020-08-11 PROCEDURE — 71045 X-RAY EXAM CHEST 1 VIEW: CPT

## 2020-08-11 PROCEDURE — 6370000000 HC RX 637 (ALT 250 FOR IP)

## 2020-08-11 PROCEDURE — 99285 EMERGENCY DEPT VISIT HI MDM: CPT

## 2020-08-11 PROCEDURE — U0002 COVID-19 LAB TEST NON-CDC: HCPCS

## 2020-08-11 PROCEDURE — 1240000000 HC EMOTIONAL WELLNESS R&B

## 2020-08-11 PROCEDURE — 81003 URINALYSIS AUTO W/O SCOPE: CPT

## 2020-08-11 RX ORDER — ACETAMINOPHEN 325 MG/1
650 TABLET ORAL EVERY 4 HOURS PRN
Status: DISCONTINUED | OUTPATIENT
Start: 2020-08-11 | End: 2020-08-17 | Stop reason: HOSPADM

## 2020-08-11 RX ORDER — LORAZEPAM 1 MG/1
1 TABLET ORAL ONCE
Status: COMPLETED | OUTPATIENT
Start: 2020-08-11 | End: 2020-08-11

## 2020-08-11 RX ORDER — TRAZODONE HYDROCHLORIDE 50 MG/1
50 TABLET ORAL ONCE
Status: COMPLETED | OUTPATIENT
Start: 2020-08-11 | End: 2020-08-11

## 2020-08-11 RX ORDER — HYDROXYZINE PAMOATE 25 MG/1
50 CAPSULE ORAL 3 TIMES DAILY PRN
Status: DISCONTINUED | OUTPATIENT
Start: 2020-08-11 | End: 2020-08-17 | Stop reason: HOSPADM

## 2020-08-11 RX ORDER — AZITHROMYCIN 250 MG/1
250 TABLET, FILM COATED ORAL DAILY
Qty: 4 TABLET | Refills: 0 | Status: ON HOLD | OUTPATIENT
Start: 2020-08-12 | End: 2020-08-17 | Stop reason: HOSPADM

## 2020-08-11 RX ORDER — POLYETHYLENE GLYCOL 3350 17 G
2 POWDER IN PACKET (EA) ORAL
Status: DISCONTINUED | OUTPATIENT
Start: 2020-08-11 | End: 2020-08-17 | Stop reason: HOSPADM

## 2020-08-11 RX ORDER — AZITHROMYCIN 250 MG/1
500 TABLET, FILM COATED ORAL ONCE
Status: COMPLETED | OUTPATIENT
Start: 2020-08-11 | End: 2020-08-11

## 2020-08-11 RX ORDER — ALBUTEROL SULFATE 90 UG/1
2 AEROSOL, METERED RESPIRATORY (INHALATION) EVERY 6 HOURS PRN
Status: ON HOLD | COMMUNITY
End: 2020-08-17 | Stop reason: HOSPADM

## 2020-08-11 RX ORDER — IBUPROFEN 400 MG/1
400 TABLET ORAL EVERY 6 HOURS PRN
Status: DISCONTINUED | OUTPATIENT
Start: 2020-08-11 | End: 2020-08-17 | Stop reason: HOSPADM

## 2020-08-11 RX ORDER — NICOTINE 21 MG/24HR
1 PATCH, TRANSDERMAL 24 HOURS TRANSDERMAL ONCE
Status: DISCONTINUED | OUTPATIENT
Start: 2020-08-11 | End: 2020-08-11 | Stop reason: HOSPADM

## 2020-08-11 RX ORDER — BENZTROPINE MESYLATE 1 MG/ML
2 INJECTION INTRAMUSCULAR; INTRAVENOUS 2 TIMES DAILY PRN
Status: DISCONTINUED | OUTPATIENT
Start: 2020-08-11 | End: 2020-08-17 | Stop reason: HOSPADM

## 2020-08-11 RX ORDER — METHYLPREDNISOLONE 4 MG/1
TABLET ORAL
Qty: 1 KIT | Refills: 0 | Status: ON HOLD | OUTPATIENT
Start: 2020-08-11 | End: 2020-08-17 | Stop reason: HOSPADM

## 2020-08-11 RX ORDER — ARIPIPRAZOLE 10 MG/1
20 TABLET ORAL DAILY
Status: ON HOLD | COMMUNITY
End: 2020-08-17 | Stop reason: HOSPADM

## 2020-08-11 RX ORDER — MAGNESIUM HYDROXIDE/ALUMINUM HYDROXICE/SIMETHICONE 120; 1200; 1200 MG/30ML; MG/30ML; MG/30ML
30 SUSPENSION ORAL EVERY 6 HOURS PRN
Status: DISCONTINUED | OUTPATIENT
Start: 2020-08-11 | End: 2020-08-17 | Stop reason: HOSPADM

## 2020-08-11 RX ORDER — TRAZODONE HYDROCHLORIDE 50 MG/1
50 TABLET ORAL NIGHTLY PRN
Status: DISCONTINUED | OUTPATIENT
Start: 2020-08-12 | End: 2020-08-13

## 2020-08-11 RX ORDER — PREDNISONE 20 MG/1
20 TABLET ORAL ONCE
Status: COMPLETED | OUTPATIENT
Start: 2020-08-11 | End: 2020-08-11

## 2020-08-11 RX ORDER — NICOTINE 21 MG/24HR
1 PATCH, TRANSDERMAL 24 HOURS TRANSDERMAL DAILY
Status: DISCONTINUED | OUTPATIENT
Start: 2020-08-12 | End: 2020-08-17 | Stop reason: HOSPADM

## 2020-08-11 RX ORDER — HYDROXYZINE PAMOATE 25 MG/1
25 CAPSULE ORAL ONCE
Status: DISCONTINUED | OUTPATIENT
Start: 2020-08-11 | End: 2020-08-11

## 2020-08-11 RX ORDER — LORAZEPAM 1 MG/1
TABLET ORAL
Status: COMPLETED
Start: 2020-08-11 | End: 2020-08-11

## 2020-08-11 RX ADMIN — TRAZODONE HYDROCHLORIDE 50 MG: 50 TABLET ORAL at 23:10

## 2020-08-11 RX ADMIN — LORAZEPAM 1 MG: 1 TABLET ORAL at 12:42

## 2020-08-11 RX ADMIN — PREDNISONE 20 MG: 20 TABLET ORAL at 05:27

## 2020-08-11 RX ADMIN — AZITHROMYCIN 500 MG: 250 TABLET, FILM COATED ORAL at 12:30

## 2020-08-11 ASSESSMENT — SLEEP AND FATIGUE QUESTIONNAIRES
DIFFICULTY FALLING ASLEEP: YES
DO YOU USE A SLEEP AID: YES
SLEEP PATTERN: DIFFICULTY FALLING ASLEEP;INSOMNIA
DIFFICULTY STAYING ASLEEP: YES
RESTFUL SLEEP: NO
DIFFICULTY ARISING: NO
DO YOU HAVE DIFFICULTY SLEEPING: YES

## 2020-08-11 ASSESSMENT — PAIN DESCRIPTION - PAIN TYPE: TYPE: ACUTE PAIN

## 2020-08-11 ASSESSMENT — PAIN DESCRIPTION - DESCRIPTORS: DESCRIPTORS: BURNING

## 2020-08-11 ASSESSMENT — PAIN DESCRIPTION - LOCATION: LOCATION: FOOT

## 2020-08-11 ASSESSMENT — PAIN SCALES - GENERAL
PAINLEVEL_OUTOF10: 10
PAINLEVEL_OUTOF10: 5

## 2020-08-11 ASSESSMENT — PAIN DESCRIPTION - FREQUENCY: FREQUENCY: CONTINUOUS

## 2020-08-11 ASSESSMENT — LIFESTYLE VARIABLES: HISTORY_ALCOHOL_USE: YES

## 2020-08-11 ASSESSMENT — PAIN - FUNCTIONAL ASSESSMENT: PAIN_FUNCTIONAL_ASSESSMENT: ACTIVITIES ARE NOT PREVENTED

## 2020-08-11 ASSESSMENT — PAIN DESCRIPTION - ORIENTATION: ORIENTATION: RIGHT;LEFT

## 2020-08-11 ASSESSMENT — PAIN DESCRIPTION - PROGRESSION: CLINICAL_PROGRESSION: NOT CHANGED

## 2020-08-11 ASSESSMENT — PAIN DESCRIPTION - ONSET: ONSET: SUDDEN

## 2020-08-11 NOTE — ED NOTES
Bed: 11  Expected date:   Expected time:   Means of arrival:   Comments:  [facility] pd  psjcarlos pt     Fredrica Sensing  08/11/20 7996

## 2020-08-11 NOTE — ED NOTES
Pt a&o to self, and time. Skin WNL for pts ethnicity. Pt c/o SI/HI. Pt denies a plan but sts he wishes he was dead. According to [place] PD pt was found in back of [facility] at [employer] through speaking \"out of his head. \" Pt reported to PD that he voluntarily signed himself out of [address]. Pt reported to the PD that he wished to harm himself, others and kill the president. Pt calm and cooperative during time of triage. Answering most questions appropriately. 72 hr hold paperwork signed by [facility] PD. Pt rates pain in his feet, barefoot at time of triage and dirty as if just came off the street. Pt has hx of depression, schizophrenia, and other psych. Reports been out of meds for at least 3 days. IV established by this RN without complications, blood work obtained and sent to lab. Pt tolerated well. Pt placed on appropriate monitors and cycling. SR with a HR of 60s-70s. ST segment alarm enabled. Pt sitting supine in bed in low position, call light in reach, all monitors in place, side rails up x2. Pt denies further needs at this time. Pt showing no signs of distress at this time. Breathing easy and unlabored. Sitter remains at bedside. Will continue to monitor.         Zelda Dunn RN  08/11/20 8318

## 2020-08-11 NOTE — ED NOTES
Pt stood up and placed back to bed with steady gait after urine sample. Sitter at pt side. Pt denies needs at this time. A&O with no signs of distress. Pt sitting supine in bed in low position, call light in reach, side rails up x2, and monitors in place. Will continue to monitor.           Alex Pillai RN  08/11/20 4080

## 2020-08-11 NOTE — ED NOTES
Pt awake, c/o rib pain from coughing, RN reinforced mask for pt to put on, pt leaving at 2000 to go to [employer], pt reports occasional chest pain, denies chest pain at this time, pt reports he still will harm himself if he still walks everywhere, sitter at bs, no cords in room, no cardiac monitor cord in room,      Eloy Glass RN  08/11/20 1930

## 2020-08-11 NOTE — ED PROVIDER NOTES
CHIEF COMPLAINT  Poison Araceli (Pt present to the ED with poison ivy all overbody. Pt states he is homeless has been running E for far to long. Pt report smoking weed tonight )      HISTORY OF PRESENT ILLNESS  Leisa Malagon is a 30 y.o. male who presents to the ED complaining of sunburn and \"poison ivy rash\" to his arms with itching. Also c/o \"chaffing\" in his groin \"because I walk everywhere. He is homeless, clothes and socks wet from rain. Admits to cannabis use tonight. DENIES SI, HI, or HALLUCINATIONS. Has not tried anything for s/s, nothing makes better or worse aside from chaffing worse when walking. No other complaints, modifying factors or associated symptoms. Nursing notes reviewed. Past Medical History:   Diagnosis Date    Arachnoid cyst     Asthma     Eczema     GERD (gastroesophageal reflux disease)     Mood disorder, drug-induced ([facility] [address]     Suicidal ideation      Past Surgical History:   Procedure Laterality Date    APPENDECTOMY      BRAIN SURGERY       History reviewed. No pertinent family history.   Social History     Socioeconomic History    Marital status: Single     Spouse name: Not on file    Number of children: Not on file    Years of education: Not on file    Highest education level: Not on file   Occupational History    Not on file   Social Needs    Financial resource strain: Not on file    Food insecurity     Worry: Not on file     Inability: Not on file    Transportation needs     Medical: Not on file     Non-medical: Not on file   Tobacco Use    Smoking status: Current Every Day Smoker     Packs/day: 2.00     Types: Cigarettes    Smokeless tobacco: Never Used   Substance and Sexual Activity    Alcohol use: Yes     Comment: occ    Drug use: Not Currently     Frequency: 5.0 times per week     Types: Marijuana     Comment: States MJ may have been laced with meth    Sexual activity: Never   Lifestyle    Physical activity     Days per week: Not on file     Minutes per session: Not on file    Stress: Not on file   Relationships    Social connections     Talks on phone: Not on file     Gets together: Not on file     Attends Church service: Not on file     Active member of club or organization: Not on file     Attends meetings of clubs or organizations: Not on file     Relationship status: Not on file    Intimate partner violence     Fear of current or ex partner: Not on file     Emotionally abused: Not on file     Physically abused: Not on file     Forced sexual activity: Not on file   Other Topics Concern    Not on file   Social History Narrative    ** Merged History Encounter **          Current Facility-Administered Medications   Medication Dose Route Frequency Provider Last Rate Last Dose    predniSONE (DELTASONE) tablet 20 mg  20 mg Oral Once Iline Justino, DO         Current Outpatient Medications   Medication Sig Dispense Refill    ARIPiprazole (ABILIFY) 10 MG tablet Take 10 mg by mouth daily      methylPREDNISolone (MEDROL, ERIN,) 4 MG tablet Take by mouth. 1 kit 0    busPIRone (BUSPAR) 10 MG tablet Take 10 mg by mouth 3 times daily One in the morning and two before bed      HydrOXYzine Pamoate (VISTARIL PO) Take by mouth nightly      ziprasidone (GEODON) 80 MG capsule Take 60 mg by mouth nightly.       Dextromethorphan-guaiFENesin (MUCINEX DM)  MG TB12 Take 1 tablet by mouth 2 times daily 28 tablet 0    acetaminophen (AMINOFEN) 325 MG tablet Take 2 tablets by mouth every 6 hours as needed for Pain 120 tablet 3    ketorolac (TORADOL) 10 MG tablet Take 1 tablet by mouth every 6 hours as needed for Pain 20 tablet 0    tamsulosin (FLOMAX) 0.4 MG capsule Take 1 capsule by mouth daily for 5 days 5 capsule 0    melatonin 3 MG TABS tablet Take 3 mg by mouth nightly as needed      Fluticasone Furoate-Vilanterol (BREO ELLIPTA IN) Inhale into the lungs      albuterol sulfate HFA (PROVENTIL HFA) 108 (90 Base) MCG/ACT inhaler Inhale 2 puffs into the lungs ACUTE CORONARY SYNDROME, INTRACRANIAL HEMORRHAGE, MALIGNANT DYSRHYTHMIA, MENINGITIS, PNEUMONIA, PULMONARY EMBOLISM, SEPSIS, SUBARACHNOID HEMORRHAGE, SUBDURAL HEMATOMA, STROKE, or URINARY TRACT INFECTION, thus I consider the discharge disposition reasonable. Ella Warren and I have discussed the diagnosis and risks, and we agree with discharging home to follow-up with their primary doctor. We also discussed returning to the Emergency Department immediately if new or worsening symptoms occur. We have discussed the symptoms which are most concerning (e.g., changing or worsening pain, weakness, vomiting, fever) that necessitate immediate return. New Prescriptions    METHYLPREDNISOLONE (MEDROL, ERIN,) 4 MG TABLET    Take by mouth. CLINICAL IMPRESSION  1. Poison ivy dermatitis    2. Sunburn of first degree        Blood pressure 130/68, pulse 75, temperature 98.6 °F (37 °C), temperature source Oral, resp. rate 14, height 5' 11\" (1.803 m), weight 200 lb (90.7 kg), SpO2 95 %. DISPOSITION  Patient was discharged to home in good condition. This chart was generated in part by using Dragon Dictation system and may contain errors related to that system including errors in grammar, punctuation, and spelling, as well as words and phrases that may be inappropriate. When dictating, effort is made to correct spelling/grammar errors. If there are any questions or concerns please feel free to contact the dictating provider for clarification.      Evan Cardoza DO  ATTENDING, [address] [place] [address], DO  08/12/20 2029

## 2020-08-11 NOTE — ED PROVIDER NOTES
Patient did request nicotine patch prior to transfer to [facility]. This was ordered.      Rina Ferro, ELAYNE - CLAUDY  08/11/20 6284

## 2020-08-11 NOTE — ED NOTES
Report called to [address] [place] [address] on behavorial health floor at [facility]. V/u, denies questions at this time. [phone].      Yina Cosme RN  08/11/20 Colletta Breed

## 2020-08-11 NOTE — ED NOTES
Pt tested for COVID via nasal swab, in R nostril. Pt tolerated well. Swab sent directly to Lab.        Yonny Adrian RN  08/11/20 1241

## 2020-08-11 NOTE — ED NOTES
Pt walked by [place] [employer] back to room. A safety risk assessment of the patient environment was conducted and the room was modified for safety per protocol. The room is free of all removed equipment, medical supplies, and articles that may pose a threat to the patient or others such as sharp items and needle boxes. Items were removed from unlocked drawers, cabinets are locked when locks are available, extra linens, medical cords, cables, pictures from wall, ect. Are all removed. The patient call light was left in place due to the medical nature of the unit ad the needs of the patient. The patient was place in a room close to the nursing desk. The patient was placed in a hospital gown. A search of the patient and patient environment was performed. Two staff members - this RN and ED tech, conducted a witnessed search of all belongings in the presence of the patient. All personal items including sharp objects, belts, ties, and ingestibles were removed and secured. The patient and family were educated regarding items brought in by family members and visitors will be searched to verify that no potentially dangerous items are brought in to the patient. If a visitor refuses search of items- visitor will be instructed that the items cannot enter patient room. Patient  at bedside. Bed locked and in lowest position with both side rails raised. Call light within reach. Will monitor pt hourly.         Alexis Freitas RN  08/11/20 5482

## 2020-08-11 NOTE — ED PROVIDER NOTES
eMERGENCY dEPARTMENT eNCOUnter      PtName: Sony Shah  MRN: [id number]  [place] 1995  Date of evaluation: 8/11/2020  Provider: Karsten Stratton, [facility]       Chief Complaint   Patient presents with   [address] Problem     pt comes in today by [place] [employer]- states he was walking out by COVID tent wanting a test, when they went to take his temperature, pt expressed ideas of self-harm, harm to others including president. pt himself states he is not on his medications and needs those filled. states he wishes he could go to sleep and not wake up but not actively planning on killing himself. HISTORY OF PRESENT ILLNESS   (Location/Symptom, Timing/Onset,Context/Setting, Quality, Duration, Modifying Factors, Severity) Note limiting factors. HPI    Sony Shah is a 30 y.o. male who presents to the emergency department with chief complaint of schizoaffective disorder. He was found wandering near the COVID testing zone. He apparently was hallucinating. Off of his psych medications recently. He does admit to a recent dry cough but no fever chills or body aches. He admits to suicidal ideation with thoughts to kill himself by walking himself to death. No homicidal ideations. Does admit to hallucinations as well. Nursing Notes were reviewed. REVIEW OF SYSTEMS    (2+ forlevel 4; 10+ for level 5)     Review of Systems  See hpi for further details. Complete 10 point review of all systems performed and is otherwise negative unless noted above.     PAST MEDICAL HISTORY     Past Medical History:   Diagnosis Date    Arachnoid cyst     Asthma     Eczema     GERD (gastroesophageal reflux disease)     Mood disorder, drug-induced ([facility] [address]     Suicidal ideation        SURGICAL HISTORY       Past Surgical History:   Procedure Laterality Date    APPENDECTOMY      BRAIN SURGERY         CURRENT MEDICATIONS       Previous Medications    ACETAMINOPHEN (AMINOFEN) 325 MG TABLET Take 2 tablets by mouth every 6 hours as needed for Pain    ALBUTEROL SULFATE HFA (PROVENTIL HFA) 108 (90 BASE) MCG/ACT INHALER    Inhale 2 puffs into the lungs every 4 hours as needed for Wheezing or Shortness of Breath With spacer (and mask if indicated). Thanks. ARIPIPRAZOLE (ABILIFY) 10 MG TABLET    Take 10 mg by mouth daily    BUSPIRONE (BUSPAR) 10 MG TABLET    Take 10 mg by mouth 3 times daily One in the morning and two before bed    DEXTROMETHORPHAN-GUAIFENESIN (MUCINEX DM)  MG TB12    Take 1 tablet by mouth 2 times daily    FLUTICASONE FUROATE-VILANTEROL (BREO ELLIPTA IN)    Inhale into the lungs    HYDROXYZINE PAMOATE (VISTARIL PO)    Take by mouth nightly    IPRATROPIUM (ATROVENT) 0.02 % NEBULIZER SOLUTION    Take 2.5 mLs by nebulization 4 times daily. KETOROLAC (TORADOL) 10 MG TABLET    Take 1 tablet by mouth every 6 hours as needed for Pain    MELATONIN 3 MG TABS TABLET    Take 3 mg by mouth nightly as needed    METHYLPREDNISOLONE (MEDROL, ERIN,) 4 MG TABLET    Take by mouth. RESPIRATORY THERAPY SUPPLIES (NEBULIZER COMPRESSOR) KIT    1 kit by Does not apply route once for 1 dose. TAMSULOSIN (FLOMAX) 0.4 MG CAPSULE    Take 1 capsule by mouth daily for 5 days    TRAZODONE (DESYREL) 50 MG TABLET    Take 50 mg by mouth nightly. ZIPRASIDONE (GEODON) 80 MG CAPSULE    Take 60 mg by mouth nightly. ALLERGIES     Latex; Chantix [varenicline]; and Seasonal    FAMILY HISTORY     History reviewed. No pertinent family history.        SOCIAL HISTORY       Social History     Socioeconomic History    Marital status: Single     Spouse name: None    Number of children: None    Years of education: None    Highest education level: None   Occupational History    None   Social Needs    Financial resource strain: None    Food insecurity     Worry: None     Inability: None    Transportation needs     Medical: None     Non-medical: None   Tobacco Use    Smoking status: Current Every Day Smoker Packs/day: 2.00     Types: Cigarettes    Smokeless tobacco: Never Used   Substance and Sexual Activity    Alcohol use: Yes     Comment: occ    Drug use: Not Currently     Frequency: 5.0 times per week     Types: Marijuana     Comment: States MJ may have been laced with meth    Sexual activity: Never   Lifestyle    Physical activity     Days per week: None     Minutes per session: None    Stress: None   Relationships    Social connections     Talks on phone: None     Gets together: None     Attends Yarsani service: None     Active member of club or organization: None     Attends meetings of clubs or organizations: None     Relationship status: None    Intimate partner violence     Fear of current or ex partner: None     Emotionally abused: None     Physically abused: None     Forced sexual activity: None   Other Topics Concern    None   Social History Narrative    ** Merged History Encounter **            SCREENINGS           PHYSICAL EXAM    (5+ for level 4, 8+ for level 5)     ED Triage Vitals   BP Temp Temp src Pulse Resp SpO2 Height Weight   -- -- -- -- -- -- -- --       Physical Exam  Vitals signs and nursing note reviewed. Constitutional:       General: He is not in acute distress. Appearance: Normal appearance. He is well-developed. He is not ill-appearing, toxic-appearing or diaphoretic. HENT:      Head: Normocephalic and atraumatic. Right Ear: External ear normal.      Left Ear: External ear normal.      Nose: Nose normal.      Mouth/Throat:      Mouth: Mucous membranes are moist.      Pharynx: Oropharynx is clear. Eyes:      General: No scleral icterus. Right eye: No discharge. Left eye: No discharge. Conjunctiva/sclera: Conjunctivae normal.      Pupils: Pupils are equal, round, and reactive to light. Neck:      Musculoskeletal: Normal range of motion and neck supple. Vascular: No JVD. Trachea: No tracheal deviation.    Cardiovascular: Pulses: Normal pulses. Pulmonary:      Effort: Pulmonary effort is normal. No respiratory distress. Breath sounds: No stridor. Abdominal:      General: Abdomen is flat. There is no distension. Palpations: Abdomen is soft. Musculoskeletal: Normal range of motion. General: No swelling, tenderness, deformity or signs of injury. Right lower leg: No edema. Left lower leg: No edema. Skin:     General: Skin is warm and dry. Capillary Refill: Capillary refill takes less than 2 seconds. Coloration: Skin is not jaundiced or pale. Findings: No bruising, erythema, lesion or rash. Neurological:      General: No focal deficit present. Mental Status: He is alert and oriented to person, place, and time. Cranial Nerves: No cranial nerve deficit. Sensory: No sensory deficit. Motor: No weakness or abnormal muscle tone. Coordination: Coordination normal.   Psychiatric:      Comments: Abnormal thought content with abnormal behavior with suicidal ideation with plan. History of recent hallucinations         DIAGNOSTIC RESULTS       RADIOLOGY (Per Emergency Physician): Interpretation per the Radiologist below, if available at the time of this note:  Xr Chest Portable    Result Date: 8/11/2020  EXAMINATION: ONE XRAY VIEW OF THE CHEST 8/11/2020 9:46 am COMPARISON: None. HISTORY: ORDERING SYSTEM PROVIDED HISTORY: cough TECHNOLOGIST PROVIDED HISTORY: Reason for exam:->cough Reason for Exam: Mental Health Problem (pt comes in today by [place] [employer]- states he was walking out by COVID tent wanting a test, when they went to take his temperature, pt expressed ideas of self-harm, harm to others including president. pt himself states he is not on his medications and needs those filled.  states he wishes he could go to sleep and not wake up but not actively planning on killing himself. ) Acuity: Unknown Type of Exam: Unknown FINDINGS: There is minimal segmental airspace disease within the periphery of the right lower lobe. The left lung is clear. Heart size and vascularity are normal. There is no pneumothorax or effusion. Mild right basilar atelectasis versus pneumonia.        LABS:  Labs Reviewed   CBC - Abnormal; Notable for the following components:       Result Value    WBC 11.7 (*)     All other components within normal limits    Narrative:     Performed at:  [facility] [address]  [place]   Phone [phone]   COMPREHENSIVE METABOLIC PANEL W/ REFLEX TO MG FOR LOW K - Abnormal; Notable for the following components:    Sodium 135 (*)     Glucose 107 (*)     CREATININE 0.8 (*)     All other components within normal limits    Narrative:     Performed at:  [facility] [address]  [place]   Phone [phone]   [address] - Abnormal; Notable for the following components:    Cannabinoid Scrn, Ur POSITIVE (*)     All other components within normal limits    Narrative:     Performed at:  [facility] [address]  [place]   Phone [phone]   URINALYSIS - Abnormal; Notable for the following components:    Ketones, Urine 15 (*)     All other components within normal limits    Narrative:     Performed at:  [facility] [address]  [place]   Phone [phone]   ACETAMINOPHEN LEVEL - Abnormal; Notable for the following components:    Acetaminophen Level <5 (*)     All other components within normal limits    Narrative:     Performed at:  [facility] [address]  [place]   Phone [phone]   SALICYLATE LEVEL - Abnormal; Notable for the following components:    Salicylate, Serum <2.9 (*)     All other components within normal limits    Narrative:     Performed at:  [facility]  [address] type ([facility].)    3. Suicidal ideation    4. Pneumonia due to organism          DISPOSITION/PLAN   DISPOSITION        PATIENT REFERRED TO:  No follow-up provider specified. DISCHARGE MEDICATIONS:  New Prescriptions    AZITHROMYCIN (ZITHROMAX) 250 MG TABLET    Take 1 tablet by mouth daily for 4 days          (Please note:  Portions of this note were completed with a voice recognition program. Efforts were made to edit the dictations but occasionally words and phrases are mis-transcribed.)    Form v2016. J.5-cn    Estela Hernandez DO (electronically signed)  Emergency Medicine Provider              Ramonita Delacruz DO  08/11/20 [address], DO  08/11/20 9395

## 2020-08-12 LAB
CHOLESTEROL, TOTAL: 132 MG/DL (ref 0–199)
ESTIMATED AVERAGE GLUCOSE: 114 MG/DL
HBA1C MFR BLD: 5.6 %
HDLC SERPL-MCNC: 47 MG/DL (ref 40–60)
LDL CHOLESTEROL CALCULATED: 75 MG/DL
TRIGL SERPL-MCNC: 51 MG/DL (ref 0–150)
TSH SERPL DL<=0.05 MIU/L-ACNC: 0.91 UIU/ML (ref 0.27–4.2)
VLDLC SERPL CALC-MCNC: 10 MG/DL

## 2020-08-12 PROCEDURE — 6370000000 HC RX 637 (ALT 250 FOR IP): Performed by: NURSE PRACTITIONER

## 2020-08-12 PROCEDURE — 80061 LIPID PANEL: CPT

## 2020-08-12 PROCEDURE — 83036 HEMOGLOBIN GLYCOSYLATED A1C: CPT

## 2020-08-12 PROCEDURE — 99221 1ST HOSP IP/OBS SF/LOW 40: CPT | Performed by: NURSE PRACTITIONER

## 2020-08-12 PROCEDURE — 84443 ASSAY THYROID STIM HORMONE: CPT

## 2020-08-12 PROCEDURE — 6370000000 HC RX 637 (ALT 250 FOR IP): Performed by: PSYCHIATRY & NEUROLOGY

## 2020-08-12 PROCEDURE — 99223 1ST HOSP IP/OBS HIGH 75: CPT | Performed by: PSYCHIATRY & NEUROLOGY

## 2020-08-12 PROCEDURE — 36415 COLL VENOUS BLD VENIPUNCTURE: CPT

## 2020-08-12 PROCEDURE — 97165 OT EVAL LOW COMPLEX 30 MIN: CPT

## 2020-08-12 PROCEDURE — 1240000000 HC EMOTIONAL WELLNESS R&B

## 2020-08-12 PROCEDURE — 97535 SELF CARE MNGMENT TRAINING: CPT

## 2020-08-12 RX ORDER — BUDESONIDE AND FORMOTEROL FUMARATE DIHYDRATE 80; 4.5 UG/1; UG/1
2 AEROSOL RESPIRATORY (INHALATION) 2 TIMES DAILY
Status: DISCONTINUED | OUTPATIENT
Start: 2020-08-12 | End: 2020-08-17 | Stop reason: HOSPADM

## 2020-08-12 RX ORDER — OLANZAPINE 10 MG/1
10 TABLET ORAL NIGHTLY
Status: DISCONTINUED | OUTPATIENT
Start: 2020-08-12 | End: 2020-08-16

## 2020-08-12 RX ORDER — PREDNISONE 20 MG/1
20 TABLET ORAL DAILY
Status: COMPLETED | OUTPATIENT
Start: 2020-08-12 | End: 2020-08-15

## 2020-08-12 RX ORDER — AZITHROMYCIN 250 MG/1
250 TABLET, FILM COATED ORAL DAILY
Status: COMPLETED | OUTPATIENT
Start: 2020-08-12 | End: 2020-08-15

## 2020-08-12 RX ADMIN — OLANZAPINE 10 MG: 10 TABLET, FILM COATED ORAL at 21:31

## 2020-08-12 RX ADMIN — AZITHROMYCIN 250 MG: 250 TABLET, FILM COATED ORAL at 16:35

## 2020-08-12 RX ADMIN — TRAZODONE HYDROCHLORIDE 50 MG: 50 TABLET ORAL at 21:31

## 2020-08-12 RX ADMIN — NICOTINE POLACRILEX 2 MG: 2 LOZENGE ORAL at 15:31

## 2020-08-12 RX ADMIN — NICOTINE POLACRILEX 2 MG: 2 LOZENGE ORAL at 12:00

## 2020-08-12 RX ADMIN — NICOTINE POLACRILEX 2 MG: 2 LOZENGE ORAL at 20:22

## 2020-08-12 RX ADMIN — PREDNISONE 20 MG: 20 TABLET ORAL at 16:36

## 2020-08-12 RX ADMIN — NICOTINE POLACRILEX 2 MG: 2 LOZENGE ORAL at 06:55

## 2020-08-12 ASSESSMENT — SLEEP AND FATIGUE QUESTIONNAIRES
DO YOU HAVE DIFFICULTY SLEEPING: YES
DO YOU USE A SLEEP AID: YES
RESTFUL SLEEP: YES
DIFFICULTY ARISING: NO
DIFFICULTY FALLING ASLEEP: YES
AVERAGE NUMBER OF SLEEP HOURS: 7
SLEEP PATTERN: DIFFICULTY FALLING ASLEEP;DISTURBED/INTERRUPTED SLEEP;NIGHTMARES/TERRORS;EARLY AWAKENING
DIFFICULTY STAYING ASLEEP: YES

## 2020-08-12 ASSESSMENT — LIFESTYLE VARIABLES: HISTORY_ALCOHOL_USE: YES

## 2020-08-12 NOTE — BH NOTE
Purposeful Rounding    Patient Location Day room    Patient willing to engage in conversationYes    Presentation/behavior Anxious    Affect Anxious    Concerns reported:     PRN medications given:    Effectiveness of PRN medication given:    Environmental assessment Clear path to bathroom , Adequate lighting and No safety hazards noted    Fall prevention interventions in place: Yellow non-skid socks on

## 2020-08-12 NOTE — H&P
mouth. Patient taking differently: Take by mouth. Said never filled script 8/11/20 8/17/20  Dalila Del Rosario, DO   Dextromethorphan-guaiFENesin [facility] DM)  MG TB12 Take 1 tablet by mouth 2 times daily 7/23/20   Khushi Milan MD   ketorolac (TORADOL) 10 MG tablet Take 1 tablet by mouth every 6 hours as needed for Pain 6/24/20 6/29/20  [place] Dire, DO   tamsulosin [facility]) 0.4 MG capsule Take 1 capsule by mouth daily for 5 days 6/24/20 6/29/20  [place] Dire, DO   busPIRone (BUSPAR) 10 MG tablet Take 10 mg by mouth 3 times daily One in the morning and two before bed    Historical Provider, MD   melatonin 3 MG TABS tablet Take 3 mg by mouth nightly as needed    Historical Provider, MD   Fluticasone Furoate-Vilanterol (BREO ELLIPTA IN) Inhale into the lungs    Historical Provider, MD   albuterol sulfate HFA (PROVENTIL HFA) 108 (90 Base) MCG/ACT inhaler Inhale 2 puffs into the lungs every 4 hours as needed for Wheezing or Shortness of Breath With spacer (and mask if indicated). Thanks. 11/12/17 4/9/18  Mayuri Morel PA-C   Respiratory Therapy Supplies (NEBULIZER COMPRESSOR) KIT 1 kit by Does not apply route once for 1 dose. 9/10/14 9/10/14  Sravan Christianson MD   ipratropium (ATROVENT) 0.02 % nebulizer solution Take 2.5 mLs by nebulization 4 times daily. 9/10/14   Sravan Christianson MD   traZODone (DESYREL) 50 MG tablet Take 50 mg by mouth nightly. Historical Provider, MD       Allergies:  Latex; Chantix [varenicline]; Haldol [haloperidol]; and Seasonal    Social History:    TOBACCO:   reports that he has been smoking cigarettes. He has been smoking about 2.00 packs per day. He has quit using smokeless tobacco.  ETOH:   reports current alcohol use.       Family History:   Positive as follows:        Problem Relation Age of Onset    Mental Illness Mother         BAD    Depression Mother     Early Death Mother         Valium, Hydrocodone OD    Substance Abuse Mother     Substance Abuse Sister     Alcohol Abuse Brother     Substance Abuse Brother     Breast Cancer Maternal Grandmother     Cancer Maternal Grandfather         pancreatic       REVIEW OF SYSTEMS:       Constitutional: Negative for fever   HENT: Negative for sore throat   Respiratory: Negative  for dyspnea, cough   Cardiovascular: Negative for chest pain   Gastrointestinal: Negative for vomiting, diarrhea   Genitourinary: Negative for dysuria  Musculoskeletal: Negative for arthralgias   Skin: Negative for rash   Neurological: Negative for syncope   Psychiatric/Behavorial: per psychiatric evaluation    PHYSICAL EXAM:    /68   Pulse 88   Temp 97.7 °F (36.5 °C) (Temporal)   Resp 16   SpO2 97%     Gen: No distress. Alert. Eyes: PERRL. No sclera icterus. No conjunctival injection. ENT: No discharge. Pharynx clear. Neck: No JVD. No Carotid Bruit. Trachea midline. Resp: No accessory muscle use. No crackles. No wheezes. No rhonchi. CV: Regular rate. Regular rhythm. No murmur. No rub. No edema. GI: Non-tender. Non-distended. Normal bowel sounds. No hernia. Skin: Warm and dry. No nodule on exposed extremities. No rash on exposed extremities. M/S: No cyanosis. No joint deformity. No clubbing. Neuro: Awake. Grossly nonfocal    Psych: Oriented x 3. Per psychiatric evaluation      CBC:   Recent Labs     08/11/20  0927   WBC 11.7*   HGB 14.0   HCT 41.3   MCV 96.9        BMP:   Recent Labs     08/11/20  0927   *   K 4.0      CO2 25   BUN 20   CREATININE 0.8*     LIVER PROFILE:   Recent Labs     08/11/20  0927   AST 32   ALT 30   BILITOT 0.8   ALKPHOS 81     UA:  Recent Labs     08/11/20  0926   COLORU YELLOW   PHUR 6.5  6.5   CLARITYU Clear   SPECGRAV 1.023   LEUKOCYTESUR Negative   UROBILINOGEN 1.0   BILIRUBINUR Negative   BLOODU Negative   GLUCOSEU Negative       CULTURES  COVID: not detected    RADIOLOGY  CXR  Mild right basilar atelectasis versus pneumonia.        ASSESSMENT/PLAN:  Leukocytosis- likely reactive  Bronchitis  - CXR as above  - Zithromax, prednisone  - Albuterol prn. GERD  - not on PPI. Recommended F/w PCP. Asthma  - mild exacerbation  - Continue Breo ellipta  - Zithromax, short course of prednisone    Psychosis  - management per psychiatry. Tobacco Dependence  - Recommended cessation  - nicotine patch ordered    Cannabis use  - recommended cessation. Pt has no medical complaints at this time. They were informed that should a medical concern arise during their admission they may have [facility] contact us.     Adamaris CAMPOS  8/12/2020 10:33 AM
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
## 2020-08-12 NOTE — PLAN OF CARE
Problem: Altered Mood, Psychotic Behavior:  Goal: Able to verbalize decrease in frequency and intensity of hallucinations  Description: Able to verbalize decrease in frequency and intensity of hallucinations  Outcome: Ongoing     Problem: Altered Mood, Psychotic Behavior:  Goal: Able to verbalize reality based thinking  Description: Able to verbalize reality based thinking  Outcome: Ongoing     Problem: Altered Mood, Psychotic Behavior:  Goal: Absence of self-harm  Description: Absence of self-harm  Outcome: Ongoing   Patient has been calm and cooperative and medication compliant. Patient currently denies SI and HI. William Liu has  been visible and and some what social with peers. Will continue to monitor for safety.

## 2020-08-12 NOTE — PROGRESS NOTES
`[facility] [place]  Admission Note     Admission Type:   Admission Type: Voluntary    Reason for admission:  Reason for Admission: SI/HI    PATIENT STRENGTHS:  Strengths: Communication, Social Skills    Patient Strengths and Limitations:  Limitations: Tendency to isolate self, Inappropriate/potentially harmful leisure interests, Multiple barriers to leisure interests    Addictive Behavior:   Addictive Behavior  In the past 3 months, have you felt or has someone told you that you have a problem with:  : None  Do you have a history of Chemical Use?: No  Do you have a history of Alcohol Use?: Yes  Do you have a history of Street Drug Abuse?: Yes  Histroy of Prescripton Drug Abuse?: No    Medical Problems:   Past Medical History:   Diagnosis Date    ADHD     Arachnoid cyst     Asthma     Eczema     GERD (gastroesophageal reflux disease)     Mood disorder, drug-induced ([facility] [address]     Suicidal ideation        Status EXAM:  Status and Exam  Normal: No  Facial Expression: Flat  Affect: Incongruent  Level of Consciousness: Alert  Mood:Normal: No  Mood: Anxious, Suspicious  Motor Activity:Normal: No  Motor Activity: Decreased  Interview Behavior: Cooperative  Preception: Naselle to Person, Naselle to Time, [place] to Place  Attention:Normal: Yes  Thought Processes: Circumstantial  Thought Content:Normal: No  Thought Content: Preoccupations, Paranoia, Delusions  Hallucinations: Auditory (Comment)  Delusions: Yes  Delusions: Persecution  Memory:Normal: No  Memory: Poor Recent, Poor Remote  Insight and Judgment: No  Insight and Judgment: Poor Judgment  Present Suicidal Ideation: No  Present Homicidal Ideation: No    Tobacco Screening:  Practical Counseling, on admission, thony X, if applicable and completed (first 3 are required if patient doesn't refuse):             (x )  Recognizing danger situations (included triggers and roadblocks)                    ( x)  Coping skills (new ways to manage stress, exercise, relaxation techniques, changing routine, distraction)                                                           (x )  Basic information about quitting (benefits of quitting, techniques in how to quit, available resources  ( ) Referral for counseling faxed to [facility]                                           ( ) Patient refused counseling  ( ) Patient has not smoked in the last 30 days    Metabolic Screening:    No results found for: LABA1C    Lab Results   Component Value Date    CHOL 145 05/16/2020    CHOL 131 05/09/2020    CHOL 142 04/09/2018     Lab Results   Component Value Date    TRIG 55 05/16/2020    TRIG 45 05/09/2020    TRIG 101 04/09/2018     Lab Results   Component Value Date    HDL 57 05/16/2020    HDL 53 05/09/2020    HDL 47 04/09/2018     No components found for: LDLCAL  No results found for: LABVLDL      There is no height or weight on file to calculate BMI. BP Readings from Last 2 Encounters:   08/11/20 (!) 126/57   08/11/20 (!) 102/57           Pt admitted with followings belongings:  Dentures: None  Vision - Corrective Lenses: None(lost glasses)  Hearing Aid: None  Jewelry: None  Body Piercings Removed: N/A  Clothing: Pants, Shirt, Jacket / coat  Were All Patient Medications Collected?: Not Applicable  Other Valuables: Alla Kehr, Other (Comment)(one key at bottom of the bag.)     No valuables placed in safe. Patient's home medications were not brought to the hospital. Patient oriented to surroundings and program expectations and copy of patient rights given. Received admission packet. Consents reviewed, signed. Refused to sign KAMAR. Patient verbalized understanding. Patient education on precautions.                   John Gibbons LPN

## 2020-08-12 NOTE — FLOWSHEET NOTE
I feel hungry, tired, lonely, and the symptoms of recovery for awhile. \"   Perception of most stressful event prior to hospitalization \"I had to get out of [place]. They ([facility] services)  were advantage of me and I didn't like it. They gave me everything I needed but not for what I didn't ask for. \"   Perception of changes needed \"My own living space that I can call my own and possibility occasional relaxation, a job, an apartment, and a first date. \"   Strengths and Limitations   Strengths Independent in basic self-care activities; Positive leisure interests   Limitations Difficult relationships / poor social skills; Difficulty problem solving/relies on others to help solve problems;Demonstrates discomfort with /lack of social skills; Tendency to isolate self     CTRS completed pt's AT/OT Leisure Assessment.     Katerine Falcon, CTRS

## 2020-08-12 NOTE — BH NOTE
Purposeful Rounding    Patient Location Day room    Patient willing to engage in conversationYes    Presentation/behavior Anxious and Cooperative    Affect Anxious    Concerns reported:     PRN medications given:    Effectiveness of PRN medication given:    Environmental assessment Clear path to bathroom , Adequate lighting and No safety hazards noted    Fall prevention interventions in place: Yellow non-skid socks on

## 2020-08-12 NOTE — PROGRESS NOTES
Unable to verify meds. Uses [facility] in [facility] but plans to change pharmacy. He said has been in [facility] in [place] until few days ago. He does have script from [facility] for Zithromax 250 mg daily x 4 days.

## 2020-08-12 NOTE — BH NOTE
Purposeful Rounding    Patient Location Patient room    Patient willing to engage in conversationYes    Presentation/behavior Anxious and Cooperative    Affect Anxious    Concerns reported:     PRN medications given: nicotine lozenge    Effectiveness of PRN medication given:yes for patients cravings of nicotine    Environmental assessment Clear path to bathroom , Adequate lighting and No safety hazards noted    Fall prevention interventions in place: Yellow non-skid socks on

## 2020-08-12 NOTE — PLAN OF CARE
Nutrition Problem #1: Predicted inadequate energy intake  Intervention: Food and/or Nutrient Delivery: Continue Current Diet  Nutritional Goals: patient will consume > 75% of his meals on general diet order x 3 meals per day
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
## 2020-08-12 NOTE — BH NOTE
Patient belongings placed in locker. Patient has no money and no cell phone in his belongings. Wallet placed in locker with other belongings.

## 2020-08-12 NOTE — GROUP NOTE
Group Therapy Note    Date: 8/12/2020    Group Start Time: 1330  Group End Time: 9555  [facility] [address] [facility]        [facility] Note    Attendees: 7         Patient's Goal:  Patient will complete worksheet Change Your Perspective/Change Your LIfe. Will discuss how perspective change influences improved mood and outlook and identify how to apply to life circumstances. Notes:  Patient attended group. Completed the worksheet and discussed. Patient verbalized an understanding of the topic and provided 3 examples of how to look at life in a more positive manner. Status After Intervention:  Improved    Participation Level:  Active Listener and Interactive    Participation Quality: Appropriate, Attentive, Sharing and Supportive    Speech:  normal    Thought Process/Content: Logical  Linear    Affective Functioning: Congruent    Mood: anxious and depressed    Level of consciousness:  Oriented x4    Response to Learning: Able to verbalize current knowledge/experience and Capable of insight    Endings: None Reported    Modes of Intervention: Education, Support, Socialization and Exploration    Discipline Responsible: /Counselor     Signature:  Thanh Kim [facility]
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
## 2020-08-12 NOTE — PROGRESS NOTES
Inpatient Occupational Therapy  Evaluation and Treatment    Unit:  [facility]  Date:  8/12/2020  Patient Name:    Moiz Villafana  Admitting diagnosis:  Psychosis, unspecified psychosis type [facility]) Gilberto Wall Date:  8/11/2020  Precautions/Restrictions/WB Status/ Lines/ Wounds/ Oxygen:  Up as tolerated  Treatment Time:  9:05-9:53  Treatment Number:  1    Patient Goals for Therapy:  \" Have a way to take psych meds as needed instead of taking them everyday. \"  Educated pt on taking meds consistently/timely and benefits. Also educated pt on providing feedback to Psychiatrist regarding symptoms and effects of medications. Discharge Recommendations:   []Home Independently  [x] Home with someone and assist/supervision daily [] Home OT  []SNF  []ARU    DME needs for discharge:   NA     AM-PAC Score:  24     Home Health S4 Level: [x] NA   [] Level 1- Standard  []  Level 2- Social  [] Level 3- Safety  []  Level 4- Sick    ACLS:  Completed on 8/12/2020  Mode 4.4   Engaging Abilities and Following Safety Precautions When the Person Can Complete a Goal     DESCRIPTION:     34% Cognitive Assistance: The person may live with someone who does a daily check on the environment, removing any safety hazards and solving problems when minor changes in the home occur. May be alone for part of the day with a procedure for obtaining help by phone or from a neighbor. May have a daily allowance and go to familiar places in the neighborhood. 34% minimum cognitive assistance is required to set up new activities and clean up after routine activities. 8% Physical Assistance is needed to assist with fine motor activities. Preadmission Environment:    Pt. Lives   [] alone  [x]with Step Dad \"I'm on the brink of being homeless right now. \"    Preadmission Status / PLOF:  History of falls   []Yes  [x]No  Pt. Able to drive   []Yes  [x]No  Aunt provides transportation, walks  Pt Fully independent for ADLs/IADLs. [x]Yes  []No    Pt.  Required

## 2020-08-13 PROCEDURE — 97535 SELF CARE MNGMENT TRAINING: CPT

## 2020-08-13 PROCEDURE — 1240000000 HC EMOTIONAL WELLNESS R&B

## 2020-08-13 PROCEDURE — 6370000000 HC RX 637 (ALT 250 FOR IP): Performed by: NURSE PRACTITIONER

## 2020-08-13 PROCEDURE — 94761 N-INVAS EAR/PLS OXIMETRY MLT: CPT

## 2020-08-13 PROCEDURE — 94640 AIRWAY INHALATION TREATMENT: CPT

## 2020-08-13 PROCEDURE — 6370000000 HC RX 637 (ALT 250 FOR IP): Performed by: PSYCHIATRY & NEUROLOGY

## 2020-08-13 PROCEDURE — 99233 SBSQ HOSP IP/OBS HIGH 50: CPT | Performed by: PSYCHIATRY & NEUROLOGY

## 2020-08-13 RX ADMIN — Medication 2 PUFF: at 07:50

## 2020-08-13 RX ADMIN — OLANZAPINE 10 MG: 10 TABLET, FILM COATED ORAL at 20:00

## 2020-08-13 RX ADMIN — NICOTINE POLACRILEX 2 MG: 2 LOZENGE ORAL at 05:20

## 2020-08-13 RX ADMIN — HYDROXYZINE PAMOATE 50 MG: 25 CAPSULE ORAL at 16:11

## 2020-08-13 RX ADMIN — NICOTINE POLACRILEX 2 MG: 2 LOZENGE ORAL at 20:00

## 2020-08-13 RX ADMIN — NICOTINE POLACRILEX 2 MG: 2 LOZENGE ORAL at 08:37

## 2020-08-13 RX ADMIN — NICOTINE POLACRILEX 2 MG: 2 LOZENGE ORAL at 17:55

## 2020-08-13 RX ADMIN — PREDNISONE 20 MG: 20 TABLET ORAL at 08:35

## 2020-08-13 RX ADMIN — AZITHROMYCIN 250 MG: 250 TABLET, FILM COATED ORAL at 08:35

## 2020-08-13 NOTE — FLOWSHEET NOTE
Group Therapy Note    Date: 8/13/2020  Start Time: 1300  End Time:  1400  Number of Participants: 7    Type of Group: Music Group    Notes:  Pt present for Music Group. Pt actively participated and was engaged, making song selections and singing along to music. Participation Level:  Active Listener and Interactive    Participation Quality: Appropriate and Attentive      Speech:  normal      Affective Functioning: Congruent      Endings: None Reported    Modes of Intervention: Support, Socialization, Activity and Media      Discipline Responsible: Chaplain Bhavna Polanco       08/13/20 3729   Encounter Summary   Services provided to: Patient   Continue Visiting   (8/13 Music Group)   Complexity of Encounter Moderate   Length of Encounter 1 hour

## 2020-08-13 NOTE — BH NOTE
Reassessment of PRN Vistaril. Patient noted to be sitting calmly at a table. Patient states medication was effective.

## 2020-08-13 NOTE — GROUP NOTE
Group Therapy Note    Date: 8/13/2020    Group Start Time: 1110  Group End Time: 1200  Group Topic: Psychoeducation    [address] [facility]    Group Therapy Note    Attendees: 9    Patients learned about the skill of radical acceptance and patients read aloud together the psycho education handouts about acceptance. Patients discussed the skill of radical acceptance and how they can apply this skill to their lives. Notes:  Pt was engaged in group though seemed irritable and disagreeable to the information discussed in group. Pt seemed to connect and relate to supportive peers in group. Status After Intervention:  Improved    Participation Level:  Active Listener and Interactive    Participation Quality: Resistant      Speech:  normal      Thought Process/Content: Linear      Affective Functioning: Flat and Constricted/Restricted      Mood: irritable and depressed      Level of consciousness:  Alert and Oriented x4      Response to Learning: Able to verbalize current knowledge/experience, Able to verbalize/acknowledge new learning and Able to retain information      Endings: None Reported    Modes of Intervention: Education, Support, Socialization, Exploration, Clarifying, Problem-solving and Activity      Discipline Responsible: /Counselor      Signature:  JORGE Rojas, LOBITO

## 2020-08-13 NOTE — BH NOTE
Patient had shoes, a cell phone and a debit card brought in for him. Shoes placed in his locker, cell phone and debit card placed in safe with his wallet.

## 2020-08-13 NOTE — PROGRESS NOTES
Inpatient Occupational Therapy Treatment    Unit:  [facility]  Date:  8/13/2020  Patient Name:    Vianey Cardenas  Admitting diagnosis:  Psychosis, unspecified psychosis type [facility]) Jose Kaiden Date:  8/11/2020  Precautions/Restrictions/WB Status/ Lines/ Wounds/ Oxygen:  Up as tolerated  Treatment Time:   14:18-15:09  Treatment Number:  2    Subjective:  Pt. Received in gathering room and agreeable to therapy session. Discharge Recommendations:   []Home Independently  [x] Home with someone and assist/supervision daily [] Home OT    DME needs for discharge:   NA     AM-PAC Score:  24     Home Health S4 Level: [x] NA   [] Level 1- Standard  []  Level 2- Social  [] Level 3- Safety  []  Level 4- Sick    ACLS:  Completed on 8/12/2020  Mode 4.4   Engaging Abilities and Following Safety Precautions When the Person Can Complete a Goal     DESCRIPTION:     34% Cognitive Assistance: The person may live with someone who does a daily check on the environment, removing any safety hazards and solving problems when minor changes in the home occur. May be alone for part of the day with a procedure for obtaining help by phone or from a neighbor. May have a daily allowance and go to familiar places in the neighborhood. 34% minimum cognitive assistance is required to set up new activities and clean up after routine activities. 8% Physical Assistance is needed to assist with fine motor activities. Pain  []Yes  [x]No  Rating:   Location:   Pain Medicine Status: [] Denies need  [] Pain med requested  [] RN notified. Cognition    A&Ox person, place, date. Decreased insight into situation. Patient cooperative. Follows []1 step and [x] 3 step commands. Monotone voice, flat affect. Decreased awareness of errors. Decreased safety awareness. Decreased insight into limitations. Subjective:  Pt. Received in group room and agreeable to therapy session.     ADL Retraining:  Educated pt on categories of coping skills and explored useful examples of each. Coping Skills Toolbox   Self-Soothing:    Distraction:    Opposite Action:  Emotional Awareness  Mindfulness    Interest checklist:  57 interest listed. Assessment:   Pt participated in ADL Retraining/Coping Skills. Pt. Participated well however presented with decreased insight into situation. Pt. Making progress meeting two goals. Pt. Limited during tx by decreased cognition. At end of tx, pt. In gathering room. Goal(s) : To be met in 3 Visits:  1). Pt. To complete ACLS. 2). Pt. To verbalize understanding of sleep hygiene education. To be met in 5 Visits:  1). Pt. To complete 1 SMART long term goal and 2 SMART short term goals with mod assist.  2). Pt. To verbalize 3 new coping skills. (Goal Met 8/13/2020)  3). Pt. To complete interest check list.   (Goal Met 8/13/2020)  4). Pt. To verbalize understanding of 3 communication styles. 5). Pt. To complete wellness plan. 6). Pt. To complete a daily schedule of healthy activities/routines with mod assist.   7). Pt. To identify 2 memory strategies to take medications as prescribed. Plan:  Continue OT per POC.      Timed Code Treatment Minutes:   51  minutes    Total Treatment Time:   51 minutes    Signature and License Number    Nereida Mistry OTR/L  #[id number]        If patient discharges from this facility prior to next visit, this note will serve as the Discharge Summary

## 2020-08-13 NOTE — BH NOTE
Purposeful Rounding    Patient Location Day room    Patient willing to engage in conversationYes    Presentation/behavior Anxious and Guarded/Suspicious    Affect Anxious    Concerns reported:     PRN medications given:    Effectiveness of PRN medication given:    Environmental assessment Clear path to bathroom , Adequate lighting and No safety hazards noted    Fall prevention interventions in place: Yellow non-skid socks on

## 2020-08-13 NOTE — BH NOTE
Group Therapy Note    Date: 8/12/2020  Start Time: 20:00  End Time:  21:00  Number of Participants: 10    Type of Group: Recreational  Wrap up    Wellness Binder Information  Module Name:  /  Session Number:  /    Patient's Goal:  Get myself time to heal    Notes:  Continuing to work on goal    Status After Intervention:  Unchanged    Participation Level:  Active Listener and Interactive    Participation Quality: Appropriate and Attentive      Speech:  pressured      Thought Process/Content: Perseverating      Affective Functioning: Blunted      Mood: anxious and depressed      Level of consciousness:  Alert      Response to Learning: Able to change behavior      Endings: None Reported    Modes of Intervention: Socialization and Problem-solving      Discipline Responsible: [address], [employer]      Signature:  Yanira Bennett

## 2020-08-13 NOTE — BH NOTE
Purposeful Rounding    Patient Location Day room    Patient willing to engage in conversationYes    Presentation/behavior Anxious and Guarded/Suspicious    Affect Constricted and Anxious    Concerns reported: pt reports feeling anxious    PRN medications given:yes Vistaril     Effectiveness of PRN medication given:yes    Environmental assessment Clear path to bathroom , Adequate lighting and No safety hazards noted    Fall prevention interventions in place: Yellow non-skid socks on

## 2020-08-13 NOTE — BH NOTE
Purposeful Rounding    Patient Location Day room    Patient willing to engage in conversationYes    Presentation/behavior Anxious    Affect Anxious    Concerns reported:     PRN medications given:    Effectiveness of PRN medication given:    Environmental assessment room free from clutter, Clear path to bathroom , Adequate lighting and No safety hazards noted    Fall prevention interventions in place: Yellow non-skid socks on

## 2020-08-13 NOTE — BH NOTE
[facility]  Initial Interdisciplinary Treatment Plan NOTE    Review Date & Time: 11/12/20 0905    Patient was not in treatment team    Admission Type:   Admission Type: Voluntary    Reason for admission:  Reason for Admission: (wants euthanized, FOI, wanted to kill president)      Estimated Length of Stay Update:  2-3 days  Estimated Discharge Date Update: 8/17/2020    PATIENT STRENGTHS:  Patient Strengths Strengths: Communication, No significant Physical Illness  Patient Strengths and Limitations:Limitations: Difficulty problem solving/relies on others to help solve problems, Difficult relationships / poor social skills, Perceives need for assistance with self-care, External locus of control  Addictive Behavior:Addictive Behavior  In the past 3 months, have you felt or has someone told you that you have a problem with:  : Eating (too much/too little), Excessive Fluid intake, Sex/Pornography, Internet Use  Do you have a history of Chemical Use?: Yes  Do you have a history of Alcohol Use?: Yes  Do you have a history of Street Drug Abuse?: Yes  Histroy of Prescripton Drug Abuse?: Yes  Medical Problems:  Past Medical History:   Diagnosis Date    ADHD     Arachnoid cyst     Asthma     Eczema     GERD (gastroesophageal reflux disease)     Mood disorder, drug-induced ([facility] [address]     Suicidal ideation        EDUCATION:   Learner Progress Toward Treatment Goals: Reviewed goals and plan of care    Method: Individual    Outcome: Verbalized understanding and Needs reinforcement    PATIENT GOALS: Patient did not attend goals group    PLAN/TREATMENT RECOMMENDATIONS UPDATE: Patient will take medication as prescribed, eat 75% of meals, attend groups, participate in milieu activities, participate in treatment team and care planning for discharge and follow up.     GOALS UPDATE:   Time frame for Short-Term Goals: 1-2 days     Steff Betts RN

## 2020-08-14 PROCEDURE — 6370000000 HC RX 637 (ALT 250 FOR IP): Performed by: NURSE PRACTITIONER

## 2020-08-14 PROCEDURE — 99233 SBSQ HOSP IP/OBS HIGH 50: CPT | Performed by: PSYCHIATRY & NEUROLOGY

## 2020-08-14 PROCEDURE — 6370000000 HC RX 637 (ALT 250 FOR IP): Performed by: PSYCHIATRY & NEUROLOGY

## 2020-08-14 PROCEDURE — 94761 N-INVAS EAR/PLS OXIMETRY MLT: CPT

## 2020-08-14 PROCEDURE — 1240000000 HC EMOTIONAL WELLNESS R&B

## 2020-08-14 PROCEDURE — 94640 AIRWAY INHALATION TREATMENT: CPT

## 2020-08-14 RX ADMIN — NICOTINE POLACRILEX 2 MG: 2 LOZENGE ORAL at 15:31

## 2020-08-14 RX ADMIN — NICOTINE POLACRILEX 2 MG: 2 LOZENGE ORAL at 08:47

## 2020-08-14 RX ADMIN — NICOTINE POLACRILEX 2 MG: 2 LOZENGE ORAL at 10:57

## 2020-08-14 RX ADMIN — NICOTINE POLACRILEX 2 MG: 2 LOZENGE ORAL at 18:11

## 2020-08-14 RX ADMIN — PREDNISONE 20 MG: 20 TABLET ORAL at 08:47

## 2020-08-14 RX ADMIN — NICOTINE POLACRILEX 2 MG: 2 LOZENGE ORAL at 21:20

## 2020-08-14 RX ADMIN — AZITHROMYCIN 250 MG: 250 TABLET, FILM COATED ORAL at 08:47

## 2020-08-14 RX ADMIN — HYDROXYZINE PAMOATE 50 MG: 25 CAPSULE ORAL at 19:01

## 2020-08-14 RX ADMIN — Medication 2 PUFF: at 20:46

## 2020-08-14 RX ADMIN — NICOTINE POLACRILEX 2 MG: 2 LOZENGE ORAL at 05:54

## 2020-08-14 RX ADMIN — Medication 2 PUFF: at 08:12

## 2020-08-14 RX ADMIN — IBUPROFEN 400 MG: 400 TABLET, FILM COATED ORAL at 06:23

## 2020-08-14 RX ADMIN — OLANZAPINE 10 MG: 10 TABLET, FILM COATED ORAL at 20:16

## 2020-08-14 ASSESSMENT — PAIN SCALES - GENERAL: PAINLEVEL_OUTOF10: 5

## 2020-08-14 NOTE — GROUP NOTE
Group Therapy Note    Date: 8/14/2020    Group Start Time: 1330  Group End Time: 1132  Group Topic: Recreational    [facility]        Group Therapy Note    Attendees: 6    Patient's Goal: to participate in a [employer] Class to improve mobility, self-esteem, mood, and overall health, wellness, and quality of life. To discuss and identify the benefits of exercise and healthy ways to incorporate exercise into daily routine. Notes: Nicolas Donnelly actively participated in [employer] Class and completed all movement directives, with minimal encouragement. Nicolas Donnelly actively listened and contributed to processing discussion. Pt met group goal.    Status After Intervention:  Unchanged    Participation Level:  Active Listener and Interactive    Participation Quality: Appropriate and Sharing      Speech:  normal      Thought Process/Content: Linear      Affective Functioning: Flat      Mood: depressed      Level of consciousness:  Alert and Oriented x4      Response to Learning: Able to verbalize current knowledge/experience, Able to verbalize/acknowledge new learning and Progressing to goal      Endings: None Reported    Modes of Intervention: Education, Support, Socialization, Exploration, Activity and Movement      Discipline Responsible: Psychoeducational Specialist      Signature:  Misha Dunn [place]

## 2020-08-14 NOTE — PLAN OF CARE
Problem: Altered Mood, Psychotic Behavior:  Goal: Able to verbalize decrease in frequency and intensity of hallucinations  Description: Able to verbalize decrease in frequency and intensity of hallucinations  8/13/2020 1421 by Kody Schilling LPN  Outcome: Ongoing     Problem: Altered Mood, Psychotic Behavior:  Goal: Absence of self-harm  Description: Absence of self-harm  8/13/2020 1421 by Kody Schilling LPN  Outcome: Ongoing   Keegan Martinez was visible in the milieu, he was restless and mildly irritable, requested to be allowed to go over to the small side for decreased noise and so he could hear his music. He appeared to be be RTIS, although he denied this. He denied any SI and HI. He was able to contract for safety and was cooperative with medications. Requested prn sleeping medication, received Trazodone.

## 2020-08-14 NOTE — PROGRESS NOTES
Department of Psychiatry  Attending Progress Note  Chief Complaint: follow-up psychosis  Patient's chart was reviewed and collaborated with  about the treatment plan. SUBJECTIVE:    Patient is feeling unchanged. Suicidal ideation:  denies suicidal ideation. Patient does not have medication side effects. Pt stated that the trazodone and zyprexa might be too strong because he feels sleepy. We discussed d/c trazodone and keeping zyprexa. Pt verbalized agreement with this plan. Pt stated that upon discharge he will be going [address] walking. Pt does not have specific destination. Pt has family [address] [facility] but stated that he is going [address] and not home. Pt stated that he might need apt. Pt can not formulate a safety plan at this time and appears distrcated and intense. ROS: Patient has new complaints: no  Sleeping adequately:  Yes   Appetite adequate: Yes  Attending groups: No:   Visitors:No    OBJECTIVE    Physical  VITALS:  /74   Pulse 78   Temp 97.7 °F (36.5 °C) (Temporal)   Resp 18   Ht 5' 11\" (1.803 m)   SpO2 97%   BMI 25.80 kg/m²     Mental Status Examination:  Patients appearance was well-appearing, street clothes, good grooming, good hygiene and pacing. Thoughts are perseverative on going [address]. Homicidal ideations none. No abnormal movements, tics or mannerisms. Memory ZARINA Aims 0. Concentration Poor. Alert and oriented X 4. Insight and Judgement poor. Patient was superficially cooperative. Patient gait normal. Mood I am going [address], affect flat affect Hallucinations distracted, suicidal ideations no specific plan to harm self Speech spontaneous but short answers.     Data  Labs:   Admission on 08/11/2020   Component Date Value Ref Range Status    Hemoglobin A1C 08/12/2020 5.6  See comment % Final    Comment: Comment:  Diagnosis of Diabetes: > or = 6.5%  Increased risk of diabetes (Prediabetes): 5.7-6.4%  Glycemic Control: Nonpregnant Adults: <7.0% Pregnant: <6.0%        eAG 08/12/2020 114.0  mg/dL Final    Cholesterol, Total 08/12/2020 132  0 - 199 mg/dL Final    Triglycerides 08/12/2020 51  0 - 150 mg/dL Final    HDL 08/12/2020 47  40 - 60 mg/dL Final    LDL Calculated 08/12/2020 75  <100 mg/dL Final    VLDL Cholesterol Calculated 08/12/2020 10  Not Established mg/dL Final    TSH 08/12/2020 0.91  0.27 - 4.20 uIU/mL Final            Medications  Current Facility-Administered Medications: OLANZapine (ZYPREXA) tablet 10 mg, 10 mg, Oral, Nightly  azithromycin (ZITHROMAX) tablet 250 mg, 250 mg, Oral, Daily  predniSONE (DELTASONE) tablet 20 mg, 20 mg, Oral, Daily  budesonide-formoterol (SYMBICORT) 80-4.5 MCG/ACT inhaler 2 puff, 2 puff, Inhalation, BID  acetaminophen (TYLENOL) tablet 650 mg, 650 mg, Oral, Q4H PRN  ibuprofen (ADVIL;MOTRIN) tablet 400 mg, 400 mg, Oral, Q6H PRN  sterile water injection 2.1 mL, 2.1 mL, Intramuscular, Q4H PRN  benztropine mesylate (COGENTIN) injection 2 mg, 2 mg, Intramuscular, BID PRN  magnesium hydroxide (MILK OF MAGNESIA) 400 MG/5ML suspension 30 mL, 30 mL, Oral, Daily PRN  aluminum & magnesium hydroxide-simethicone (MAALOX) 200-200-20 MG/5ML suspension 30 mL, 30 mL, Oral, Q6H PRN  hydrOXYzine (VISTARIL) capsule 50 mg, 50 mg, Oral, TID PRN  nicotine (NICODERM CQ) 21 MG/24HR 1 patch, 1 patch, Transdermal, Daily  nicotine polacrilex (COMMIT) lozenge 2 mg, 2 mg, Oral, Q2H PRN    ASSESSMENT AND PLAN      Will cont meds as prescribed     1. Patient s symptoms   show no change  2. Probable discharge is next week  3. Discharge planning is incomplete  4 Suicidal ideation is unchanged  5 total time 40 minutes face to face and more than 50 % was spent coordinating care, exploring sx's and discussing pharmacotherapy.

## 2020-08-14 NOTE — BH NOTE
[facility]  Day 3 Interdisciplinary Treatment Plan NOTE    Review Date & Time: 8/14/2020 0920    Patient was not in treatment team    Admission Type:   Admission Type: Voluntary    Reason for admission:  Reason for Admission: (wants euthanized, FOI, wanted to kill president)  Estimated Length of Stay Update:  1-2 days  Estimated Discharge Date Update: 8/14/2020-8/15/2020    PATIENT STRENGTHS:  Patient Strengths Strengths: Communication, No significant Physical Illness  Patient Strengths and Limitations:Limitations: Difficulty problem solving/relies on others to help solve problems, Difficult relationships / poor social skills, Perceives need for assistance with self-care, External locus of control  Addictive Behavior:Addictive Behavior  In the past 3 months, have you felt or has someone told you that you have a problem with:  : Eating (too much/too little), Excessive Fluid intake, Sex/Pornography, Internet Use  Do you have a history of Chemical Use?: Yes  Do you have a history of Alcohol Use?: Yes  Do you have a history of Street Drug Abuse?: Yes  Histroy of Prescripton Drug Abuse?: Yes  Medical Problems:  Past Medical History:   Diagnosis Date    ADHD     Arachnoid cyst     Asthma     Eczema     GERD (gastroesophageal reflux disease)     Mood disorder, drug-induced ([facility] [address]     Suicidal ideation        Risk:  Fall RiskTotal: 69  Alli Scale Alli Scale Score: 22  BVC Total: 1      Status EXAM:   Status and Exam  Normal: No  Facial Expression: Avoids Gaze, Flat  Affect: Constricted  Level of Consciousness: Alert  Mood:Normal: No  Mood: Anxious  Motor Activity:Normal: Yes  Motor Activity: Decreased  Interview Behavior: Cooperative  Preception: [place] to Person, [place] to Time, [place] to Place, [place] to Situation  Attention:Normal: No  Attention: Distractible  Thought Processes: Circumstantial  Thought Content:Normal: No  Thought Content: Preoccupations  Hallucinations: None  Delusions: No  Delusions: Persecution  Memory:Normal: No  Memory: Poor Recent  Insight and Judgment: No  Insight and Judgment: Poor Insight  Present Suicidal Ideation: No  Present Homicidal Ideation: No    Daily Assessment Last Entry:   Daily Sleep (WDL): Within Defined Limits         Patient Currently in Pain: Denies  Daily Nutrition (WDL): Within Defined Limits    Patient Monitoring:  Frequency of Checks: 4 times per hour, close    Psychiatric Symptoms:   Depression Symptoms  Depression Symptoms: No problems reported or observed. Anxiety Symptoms  Anxiety Symptoms: Generalized  Letty Symptoms  Letty Symptoms: No problems reported or observed. Psychosis Symptoms  Delusion Type: No problems reported or observed. Suicide Risk CSSR-S:  1) Within the past month, have you wished you were dead or wished you could go to sleep and not wake up? : Yes  2) Have you actually had any thoughts of killing yourself? : No  3) Have you been thinking about how you might kill yourself? : No  5) Have you started to work out or worked out the details of how to kill yourself? Do you intend to carry out this plan? : No  6) Have you ever done anything, started to do anything, or prepared to do anything to end your life?: No      EDUCATION:   Learner Progress Toward Treatment Goals: Reviewed goals and plan of care    Method: Individual    Outcome: Verbalized understanding    PATIENT GOALS: \"stay content and avoid rough situations\"    PLAN/TREATMENT RECOMMENDATIONS UPDATE: Patient will take medication as prescribed, eat 75% of meals, attend groups, participate in milieu activities, participate in treatment team and care planning for discharge and follow up.     GOALS UPDATE:   Time frame for Short-Term Goals: 1-2 days      Socorro Barrientos RN

## 2020-08-14 NOTE — PROGRESS NOTES
Department of Psychiatry  Attending Progress Note  Chief Complaint: follow-up psychosis  Patient's chart was reviewed and collaborated with  about the treatment plan. SUBJECTIVE:    Patient is feeling unchanged. Suicidal ideation:  denies suicidal ideation. Patient does not have medication side effects. Pt is pacing in room during interview stating that he needs his script so he can stop at [employer] get his tent and head down [address]. I asked for family and he stated that they betrayed him. Pt can not formulate a safety plan at this time and appears distrcated and intense. ROS: Patient has new complaints: no  Sleeping adequately:  Yes   Appetite adequate: Yes  Attending groups: No:   Visitors:No    OBJECTIVE    Physical  VITALS:  /74   Pulse 78   Temp 97.7 °F (36.5 °C) (Temporal)   Resp 18   Ht 5' 11\" (1.803 m)   SpO2 97%   BMI 25.80 kg/m²     Mental Status Examination:  Patients appearance was well-appearing, street clothes, good grooming, good hygiene and pacing. Thoughts are perseverative on going [address]. Homicidal ideations none. No abnormal movements, tics or mannerisms. Memory ZARINA Aims 0. Concentration Poor. Alert and oriented X 4. Insight and Judgement poor. Patient was superficially cooperative. Patient gait normal. Mood I am going [address], affect flat affect Hallucinations distracted, suicidal ideations no specific plan to harm self Speech spontaneous but short answers.     Data  Labs:   Admission on 08/11/2020   Component Date Value Ref Range Status    Hemoglobin A1C 08/12/2020 5.6  See comment % Final    Comment: Comment:  Diagnosis of Diabetes: > or = 6.5%  Increased risk of diabetes (Prediabetes): 5.7-6.4%  Glycemic Control: Nonpregnant Adults: <7.0%                    Pregnant: <6.0%        eAG 08/12/2020 114.0  mg/dL Final    Cholesterol, Total 08/12/2020 132  0 - 199 mg/dL Final    Triglycerides 08/12/2020 51  0 - 150 mg/dL Final    HDL 08/12/2020 47  40 - 60 mg/dL

## 2020-08-14 NOTE — PLAN OF CARE
Problem: Altered Mood, Psychotic Behavior:  Goal: Able to verbalize reality based thinking  Description: Able to verbalize reality based thinking  Outcome: OngoinParticipating in group. Asking appropriate questions. Pacing ubit when not in group. Denies hallucinations.

## 2020-08-15 PROCEDURE — 94640 AIRWAY INHALATION TREATMENT: CPT

## 2020-08-15 PROCEDURE — 6370000000 HC RX 637 (ALT 250 FOR IP): Performed by: NURSE PRACTITIONER

## 2020-08-15 PROCEDURE — 99233 SBSQ HOSP IP/OBS HIGH 50: CPT | Performed by: NURSE PRACTITIONER

## 2020-08-15 PROCEDURE — 6370000000 HC RX 637 (ALT 250 FOR IP): Performed by: PSYCHIATRY & NEUROLOGY

## 2020-08-15 PROCEDURE — 1240000000 HC EMOTIONAL WELLNESS R&B

## 2020-08-15 RX ORDER — TRAZODONE HYDROCHLORIDE 50 MG/1
50 TABLET ORAL NIGHTLY PRN
Status: DISCONTINUED | OUTPATIENT
Start: 2020-08-15 | End: 2020-08-17 | Stop reason: HOSPADM

## 2020-08-15 RX ADMIN — NICOTINE POLACRILEX 2 MG: 2 LOZENGE ORAL at 18:19

## 2020-08-15 RX ADMIN — NICOTINE POLACRILEX 2 MG: 2 LOZENGE ORAL at 11:21

## 2020-08-15 RX ADMIN — PREDNISONE 20 MG: 20 TABLET ORAL at 08:15

## 2020-08-15 RX ADMIN — OLANZAPINE 10 MG: 10 TABLET, FILM COATED ORAL at 21:07

## 2020-08-15 RX ADMIN — Medication 2 PUFF: at 17:29

## 2020-08-15 RX ADMIN — TRAZODONE HYDROCHLORIDE 50 MG: 50 TABLET ORAL at 21:56

## 2020-08-15 RX ADMIN — NICOTINE POLACRILEX 2 MG: 2 LOZENGE ORAL at 14:14

## 2020-08-15 RX ADMIN — HYDROXYZINE PAMOATE 50 MG: 25 CAPSULE ORAL at 16:48

## 2020-08-15 RX ADMIN — AZITHROMYCIN 250 MG: 250 TABLET, FILM COATED ORAL at 08:15

## 2020-08-15 RX ADMIN — NICOTINE POLACRILEX 2 MG: 2 LOZENGE ORAL at 16:48

## 2020-08-15 RX ADMIN — NICOTINE POLACRILEX 2 MG: 2 LOZENGE ORAL at 04:03

## 2020-08-15 RX ADMIN — NICOTINE POLACRILEX 2 MG: 2 LOZENGE ORAL at 08:15

## 2020-08-15 NOTE — GROUP NOTE
Group Therapy Note    Date: 8/15/2020    Group Start Time: 1330  Group End Time: 1400  Group Topic: [address] [facility]        Group Therapy Note    Attendees: 8         Patient's Goal:  Patient will complete worksheet Don't Take Things Personally. Will explore ways to detach self from other's emotions by not making it about self. Notes:  Patient engaged well in group. Completed the worksheet and discussed. Talked about ways to detach from others negative emotions. Was able to reframe others thoughts, feelings, and behaviors as a reflection of themselves. Status After Intervention:  Improved    Participation Level:  Active Listener and Interactive    Participation Quality: Appropriate, Attentive, Sharing and Supportive      Speech:  normal      Thought Process/Content: Logical      Affective Functioning: Congruent      Mood: anxious and depressed      Level of consciousness:  Alert and Oriented x4      Response to Learning: Able to verbalize current knowledge/experience      Endings: None Reported    Modes of Intervention: Education, Support, Socialization and Exploration      Discipline Responsible: /Counselor      Signature:  Mabel Caal [facility]

## 2020-08-15 NOTE — PROGRESS NOTES
Department of Psychiatry  Progress Note    Admission Date:  8/11/2020    Chief Complaint / Reason for Admission: Psychosis     Patient's chart was reviewed, case was discussed with nursing/OT/RT staff, and collaborated with  about the treatment plan. SUBJECTIVE:   Over last 24 hours:  Behavioral outbursts: No   Non-aggressive behavioral disturbance: No  Medication compliant: Yes  Need for seclusion/restraints: No  Sleeping adequately: Yes  Appetite adequate: Yes  Attending groups: Yes    Ernesto Huggisn was overall cooperative with assessment but appeared distracted at times. He reports that today has been \"erratic\" and when asked to explain what he meant, he states \"I've enjoyed the day but I don't think others have enjoyed my company. I don't feel like I have any friends here\". He notes that his mood started off \"pretty good but I got grumpy after lunch\". He endorses mild anxiety and when asked about suicidal ideations, he states \"not really, more like the opposite\".  He did discuss wanting to head [place] after discharge from the hospital.     Suicidal ideation: \"Not really, more like the opposite\"  Homicidal ideation: Denies   Medication side effects: Denies    ROS: Patient has new complaints: No    Current Medications Ordered:   OLANZapine  10 mg Oral Nightly    budesonide-formoterol  2 puff Inhalation BID    nicotine  1 patch Transdermal Daily      PRN Meds: acetaminophen, ibuprofen, sterile water, benztropine mesylate, magnesium hydroxide, aluminum & magnesium hydroxide-simethicone, hydrOXYzine, nicotine polacrilex     Objective:     PE:    BP (!) 151/76   Pulse 71   Temp 98 °F (36.7 °C) (Oral)   Resp 18   Ht 5' 11\" (1.803 m)   SpO2 98%   BMI 25.80 kg/m²       Motor / Gait: Noted to be pacing at times, normal gait and station    Mental Status Examination:    Appearance: WM, appears stated age, in chair, wearing personal clothing, good grooming and hygiene   Behavior/Attitude Toward Examiner: Superficially cooperative, fair eye contact  Speech: Spontaneous, normal rate, normal volume  Mood: \"Erratic\"  Affect: Flat  Thought Processes: Perseverative  Thought Content: Denies SI, denies HI  Perceptions: Denies AVH but appears distracted  Attention: Impaired  Abstraction: Impaired  Cognition: Average CRUZ, alert and oriented to person, place, time, and situation  Insight: Impaired insight   Judgment: Impaired judgment      LAB: Reviewed labs from last 24 hours      Dx:   Primary Psychiatric (DSM V) Diagnosis: BAD, jessica with psychosis   Secondary Psychiatric (DSM V) Diagnoses: None  Chemical Dependency Diagnoses: Stimulant, opiate, cannabis abuse  Active Medical Diagnoses: Leukocytosis - likely reactive, bronchitis, GERD, asthma    All conditions detailed above are being treated while patient is hospitalized. Tx Plan: Generally: prevent self injury/aggression towards others, stabilize mood/anxiety/psychotic/behavioral disturbance, establish/maintain aftercare, increase coping mechanisms, improve medication compliance. All conditions present on admission are being treated while pt is hospitalized. Legal Status: Involuntary    Primary Psychiatric Issues:   1. BAD, jessica with psychosis  - Currently on Zyprexa 10 mg nightly. Will increase to 15 mg HS. Chemical Dependency Issues:  - History of stimulant, opiate, cannabis abuse     Medical Problems:  Internal medicine has been consulted. Appreciate recs. Leukocytosis- likely reactive  Bronchitis  - CXR as above  - Zithromax, prednisone  - Albuterol prn.      GERD  - not on PPI.  Recommended F/w PCP.      Asthma  - mild exacerbation  - Continue Breo ellipta  - Zithromax, short course of prednisone    Code Status: Full Code    Disposition:    - Housing: Homeless  - Current outpatient follow-up: None   - Discharge planning is incomplete    Estimated Length of Stay: 5-7 days     Criteria for Discharge:  Not suicidal, not homicidal, not grossly psychotic, behavioral disturbance improved, sleeping well, mood/affect stable, eating well, aftercare arranged. Total face to face time with patient was 30 minutes and more than 50 % of that time was spent counseling the patient on their symptoms, treatment and expected goals.     Jaida Espino, MPH, APRN, PMHNP-BC  08/15/20

## 2020-08-15 NOTE — PLAN OF CARE
Problem: Altered Mood, Psychotic Behavior:  Goal: Able to verbalize decrease in frequency and intensity of hallucinations  Description: Able to verbalize decrease in frequency and intensity of hallucinations  Outcome: Ongoing     Problem: Nutrition  Goal: Optimal nutrition therapy  Outcome: Ongoing     Problem: Nutrition  Goal: Understanding of nutritional guidelines  Outcome: Ongoing     Pt is visible to floor and social. He is calm and cooperative. A&O to person place and time. Denies SI/HI/AH/VH but does seem to have some thought blocking. He has flat affect and limited answers. He contracts for safety. Does not report any support at home. Does not report any discharge plans at this time.

## 2020-08-16 PROCEDURE — 1240000000 HC EMOTIONAL WELLNESS R&B

## 2020-08-16 PROCEDURE — 94640 AIRWAY INHALATION TREATMENT: CPT

## 2020-08-16 PROCEDURE — 94761 N-INVAS EAR/PLS OXIMETRY MLT: CPT

## 2020-08-16 PROCEDURE — 6370000000 HC RX 637 (ALT 250 FOR IP): Performed by: NURSE PRACTITIONER

## 2020-08-16 RX ADMIN — NICOTINE POLACRILEX 2 MG: 2 LOZENGE ORAL at 07:25

## 2020-08-16 RX ADMIN — Medication 2 PUFF: at 20:31

## 2020-08-16 RX ADMIN — HYDROXYZINE PAMOATE 50 MG: 25 CAPSULE ORAL at 13:14

## 2020-08-16 RX ADMIN — OLANZAPINE 15 MG: 10 TABLET, FILM COATED ORAL at 21:58

## 2020-08-16 RX ADMIN — NICOTINE POLACRILEX 2 MG: 2 LOZENGE ORAL at 22:01

## 2020-08-16 RX ADMIN — NICOTINE POLACRILEX 2 MG: 2 LOZENGE ORAL at 04:37

## 2020-08-16 RX ADMIN — Medication 2 PUFF: at 08:40

## 2020-08-16 RX ADMIN — NICOTINE POLACRILEX 2 MG: 2 LOZENGE ORAL at 13:54

## 2020-08-16 RX ADMIN — NICOTINE POLACRILEX 2 MG: 2 LOZENGE ORAL at 11:16

## 2020-08-16 NOTE — GROUP NOTE
Group Therapy Note    Date: 8/16/2020    Group Start Time: 1300  Group End Time: 1400  Group Topic: Cognitive Skills    [address] [facility]        Group Therapy Note    Attendees: 8         Patient's Goal:  Patient will identify healthy boundaries, positive steps to establish healthy boundaries post-D/C. Notes:  Patient actively attentive during discussions, requiring direct prompts to engage. Pt fixated on negative relationship with mother and father, talking about not being appreciated when doing good things for them, unable to differentiate between boundaries and punishment.      Status After Intervention:  Unchanged    Participation Level: Monopolizing    Participation Quality: Sharing and Inappropriate      Speech:  pressured and loud      Thought Process/Content: Perseverating      Affective Functioning: Blunted      Mood: elevated      Level of consciousness:  Preoccupied      Response to Learning: Capable of insight and Able to change behavior      Endings: None Reported    Modes of Intervention: Education, Support and Exploration      Discipline Responsible: Psychoeducational Specialist      Signature:  Jamie Frankel, MM, MT-BC

## 2020-08-16 NOTE — GROUP NOTE
Group Therapy Note    Date: 8/16/2020    Group Start Time: 0900  Group End Time: 0844  [facility] [address] [facility]        [facility] Note    Attendees: 9         Patient's Goal:  Patient will complete worksheet Don't Take Things Personally. Will explore ways to detach self from other's emotions by not making it about self. Notes:  Patient engaged well in group. Completed the worksheet and discussed. Talked about ways to detach from others negative emotions. Was able to reframe others thoughts, feelings, and behaviors as a reflection of themselves. Status After Intervention:  Improved    Participation Level:  Active Listener and Interactive    Participation Quality: Appropriate, Attentive, Sharing and Supportive      Speech:  normal      Thought Process/Content: Logical      Affective Functioning: Congruent      Mood: anxious and depressed      Level of consciousness:  Alert and Oriented x4      Response to Learning: Able to verbalize current knowledge/experience      Endings: None Reported    Modes of Intervention: Education, Support, Socialization and Exploration      Discipline Responsible: /Counselor      Signature:  Maurizio Romero, [facility]

## 2020-08-16 NOTE — PLAN OF CARE
Problem: Altered Mood, Psychotic Behavior:  Goal: Able to verbalize reality based thinking  8/15/2020 2209 by Yasmany Pulliam RN  Outcome: Ongoing  Note: Patient administered regular bedtime medication. Patient attempted to lay down and was unable to fall asleep. Patient came to writer and requested trazodone. Patient did not have trazodone ordered. Writer obtained order from provider on call Dr. Mundo Pisano. Patient took medication. Will monitor for medication effectiveness.

## 2020-08-17 VITALS
HEIGHT: 71 IN | HEART RATE: 81 BPM | BODY MASS INDEX: 25.8 KG/M2 | RESPIRATION RATE: 16 BRPM | OXYGEN SATURATION: 97 % | TEMPERATURE: 97.8 F | SYSTOLIC BLOOD PRESSURE: 137 MMHG | DIASTOLIC BLOOD PRESSURE: 72 MMHG

## 2020-08-17 PROCEDURE — 6370000000 HC RX 637 (ALT 250 FOR IP): Performed by: NURSE PRACTITIONER

## 2020-08-17 PROCEDURE — 5130000000 HC BRIDGE APPOINTMENT

## 2020-08-17 PROCEDURE — 94640 AIRWAY INHALATION TREATMENT: CPT

## 2020-08-17 PROCEDURE — 99239 HOSP IP/OBS DSCHRG MGMT >30: CPT | Performed by: PSYCHIATRY & NEUROLOGY

## 2020-08-17 RX ORDER — OLANZAPINE 15 MG/1
15 TABLET ORAL NIGHTLY
Qty: 30 TABLET | Refills: 0 | Status: SHIPPED | OUTPATIENT
Start: 2020-08-17

## 2020-08-17 RX ADMIN — NICOTINE POLACRILEX 2 MG: 2 LOZENGE ORAL at 06:44

## 2020-08-17 RX ADMIN — Medication 2 PUFF: at 08:06

## 2020-08-17 RX ADMIN — NICOTINE POLACRILEX 2 MG: 2 LOZENGE ORAL at 08:39

## 2020-08-17 RX ADMIN — NICOTINE POLACRILEX 2 MG: 2 LOZENGE ORAL at 11:03

## 2020-08-17 ASSESSMENT — PAIN SCALES - GENERAL: PAINLEVEL_OUTOF10: 0

## 2020-08-17 NOTE — GROUP NOTE
Group Therapy Note    Date: 8/16/2020    Group Start Time: 2020  Group End Time: 2045  Group Topic: Wrap-Up    [address] [facility]        Group Therapy Note    Attendees: Goals and importance of goal setting discussed. Night time milieu activities discussed. Patient's Goal:  Attend groups    Notes:  Successful     Status After Intervention:  Improved    Participation Level:  Active Listener and Interactive    Participation Quality: Appropriate      Speech:  hesitant      Thought Process/Content: Logical  Linear  Perseverating      Affective Functioning: Congruent      Mood: anxious      Level of consciousness:  Alert and Oriented x4      Response to Learning: Progressing to goal      Endings: None Reported    Modes of Intervention: Support      Discipline Responsible: [employer]      Signature:  Melida Fraire

## 2020-08-17 NOTE — BH NOTE
[id number] [facility]  Discharge Note    Pt discharged with followings belongings:   Dentures: None  Vision - Corrective Lenses: None(lost glasses)  Hearing Aid: None  Jewelry: None  Body Piercings Removed: N/A  Clothing: Footwear  Were All Patient Medications Collected?: Not Applicable  Other Valuables: Cell phone, Other (Comment)(debit card)   Valuables sent home with patient. Valuables retrieved from safe and returned to patient. Patient education on aftercare instructions: yes. Information faxed to N/A by this writer. Patient verbalize understanding of AVS:  yes.     Status EXAM upon discharge:  Status and Exam  Normal: No  Facial Expression: Worried  Affect: Blunt  Level of Consciousness: Alert  Mood:Normal: No  Mood: Anxious, Suspicious  Motor Activity:Normal: Yes  Motor Activity: Increased, Unusual Posture/Gait(doing cart wheels and kickboxing)  Interview Behavior: Cooperative  Preception: [place] to Person, Peggyann Dynes to Time, [place] to Place, [place] to Situation  Attention:Normal: No  Attention: Distractible  Thought Processes: Circumstantial  Thought Content:Normal: Yes  Thought Content: Delusions  Hallucinations: None  Delusions: Yes  Delusions: Obsessions  Memory:Normal: No  Memory: Poor Recent, Poor Remote  Insight and Judgment: No  Insight and Judgment: Poor Insight, Poor Judgment  Present Suicidal Ideation: No  Present Homicidal Ideation: No      Metabolic Screening:    Lab Results   Component Value Date    LABA1C 5.6 08/12/2020       Lab Results   Component Value Date    CHOL 132 08/12/2020    CHOL 145 05/16/2020    CHOL 131 05/09/2020    CHOL 142 04/09/2018     Lab Results   Component Value Date    TRIG 51 08/12/2020    TRIG 55 05/16/2020    TRIG 45 05/09/2020    TRIG 101 04/09/2018     Lab Results   Component Value Date    HDL 47 08/12/2020    HDL 57 05/16/2020    HDL 53 05/09/2020    HDL 47 04/09/2018     No components found for: [facility] [place]  Lab Results   Component Value Date    LABVLDL 10 08/12/2020 Alysha Barros RN    Bridge Appointment completed: Reviewed Discharge Instructions with patient. Patient verbalizes understanding and agreement with the discharge plan using the teachback method.      Referral for Outpatient Tobacco Cessation Counseling, upon discharge (thony X if applicable and completed):    ( )  Hospital staff assisted patient to call Quit Line or faxed referral                                   during hospitalization                  (X)  Recognizing danger situations (included triggers and roadblocks), if not completed on admission                    (X)  Coping skills (new ways to manage stress, exercise, relaxation techniques, changing routine, distraction), if not completed on admission                                                           (X)  Basic information about quitting (benefits of quitting, techniques in how to quit, available resources, if not completed on admission  ( ) Referral for counseling faxed to [facility]   ( ) Patient refused referral  ( ) Patient refused counseling  ( ) Patient refused smoking cessation medication upon discharge

## 2020-08-17 NOTE — BH NOTE
Patient will be discharged home today per Psychiatrist order. He denies any current psychiatric symptoms. Evasive and minimal in conversation. Flat affect, appears worried. He will be discharged to homeless shelter in [place], [place].

## 2020-09-05 ENCOUNTER — HOSPITAL ENCOUNTER (EMERGENCY)
Age: 25
Discharge: ANOTHER HOSPITAL | End: 2020-09-06
Attending: EMERGENCY MEDICINE
Payer: COMMERCIAL

## 2020-09-05 PROCEDURE — 99285 EMERGENCY DEPT VISIT HI MDM: CPT

## 2020-09-06 VITALS
TEMPERATURE: 98.6 F | BODY MASS INDEX: 28 KG/M2 | SYSTOLIC BLOOD PRESSURE: 122 MMHG | DIASTOLIC BLOOD PRESSURE: 67 MMHG | OXYGEN SATURATION: 99 % | WEIGHT: 200 LBS | HEIGHT: 71 IN | HEART RATE: 88 BPM | RESPIRATION RATE: 19 BRPM

## 2020-09-06 LAB
ACETAMINOPHEN LEVEL: <5 UG/ML (ref 15–30)
ALBUMIN SERPL-MCNC: 4.2 GM/DL (ref 3.4–5)
ALCOHOL SCREEN SERUM: 0.01 %WT/VOL
ALP BLD-CCNC: 76 IU/L (ref 40–129)
ALT SERPL-CCNC: 30 U/L (ref 10–40)
AMPHETAMINES: NEGATIVE
ANION GAP SERPL CALCULATED.3IONS-SCNC: 12 MMOL/L (ref 4–16)
AST SERPL-CCNC: 31 IU/L (ref 15–37)
BACTERIA: NEGATIVE /HPF
BARBITURATE SCREEN URINE: NEGATIVE
BASOPHILS ABSOLUTE: 0.1 K/CU MM
BASOPHILS RELATIVE PERCENT: 0.8 % (ref 0–1)
BENZODIAZEPINE SCREEN, URINE: NEGATIVE
BILIRUB SERPL-MCNC: 0.1 MG/DL (ref 0–1)
BILIRUBIN URINE: NEGATIVE MG/DL
BLOOD, URINE: ABNORMAL
BUN BLDV-MCNC: 16 MG/DL (ref 6–23)
CALCIUM SERPL-MCNC: 9.6 MG/DL (ref 8.3–10.6)
CANNABINOID SCREEN URINE: ABNORMAL
CHLORIDE BLD-SCNC: 103 MMOL/L (ref 99–110)
CLARITY: CLEAR
CO2: 24 MMOL/L (ref 21–32)
COCAINE METABOLITE: NEGATIVE
COLOR: YELLOW
CREAT SERPL-MCNC: 0.9 MG/DL (ref 0.9–1.3)
DIFFERENTIAL TYPE: ABNORMAL
DOSE AMOUNT: ABNORMAL
DOSE AMOUNT: ABNORMAL
DOSE TIME: ABNORMAL
DOSE TIME: ABNORMAL
EOSINOPHILS ABSOLUTE: 0.5 K/CU MM
EOSINOPHILS RELATIVE PERCENT: 5.5 % (ref 0–3)
GFR AFRICAN AMERICAN: >60 ML/MIN/1.73M2
GFR NON-AFRICAN AMERICAN: >60 ML/MIN/1.73M2
GLUCOSE BLD-MCNC: 83 MG/DL (ref 70–99)
GLUCOSE, URINE: NEGATIVE MG/DL
HCT VFR BLD CALC: 42.6 % (ref 42–52)
HEMOGLOBIN: 14.3 GM/DL (ref 13.5–18)
IMMATURE NEUTROPHIL %: 0.4 % (ref 0–0.43)
KETONES, URINE: NEGATIVE MG/DL
LEUKOCYTE ESTERASE, URINE: NEGATIVE
LIPASE: 28 IU/L (ref 13–60)
LYMPHOCYTES ABSOLUTE: 2.5 K/CU MM
LYMPHOCYTES RELATIVE PERCENT: 27.8 % (ref 24–44)
MCH RBC QN AUTO: 32.4 PG (ref 27–31)
MCHC RBC AUTO-ENTMCNC: 33.6 % (ref 32–36)
MCV RBC AUTO: 96.4 FL (ref 78–100)
MONOCYTES ABSOLUTE: 0.8 K/CU MM
MONOCYTES RELATIVE PERCENT: 8.5 % (ref 0–4)
MUCUS: ABNORMAL HPF
NITRITE URINE, QUANTITATIVE: NEGATIVE
NUCLEATED RBC %: 0 %
OPIATES, URINE: NEGATIVE
OXYCODONE: NEGATIVE
PDW BLD-RTO: 13.5 % (ref 11.7–14.9)
PH, URINE: 6 (ref 5–8)
PHENCYCLIDINE, URINE: NEGATIVE
PLATELET # BLD: 311 K/CU MM (ref 140–440)
PMV BLD AUTO: 9.3 FL (ref 7.5–11.1)
POTASSIUM SERPL-SCNC: 4.3 MMOL/L (ref 3.5–5.1)
PROTEIN UA: NEGATIVE MG/DL
RBC # BLD: 4.42 M/CU MM (ref 4.6–6.2)
RBC URINE: 11 /HPF (ref 0–3)
SALICYLATE LEVEL: <0.3 MG/DL (ref 15–30)
SARS-COV-2, NAAT: NOT DETECTED
SEGMENTED NEUTROPHILS ABSOLUTE COUNT: 5.1 K/CU MM
SEGMENTED NEUTROPHILS RELATIVE PERCENT: 57 % (ref 36–66)
SODIUM BLD-SCNC: 139 MMOL/L (ref 135–145)
SOURCE: NORMAL
SPECIFIC GRAVITY UA: 1.02 (ref 1–1.03)
TOTAL IMMATURE NEUTOROPHIL: 0.04 K/CU MM
TOTAL NUCLEATED RBC: 0 K/CU MM
TOTAL PROTEIN: 6.5 GM/DL (ref 6.4–8.2)
TRICHOMONAS: ABNORMAL /HPF
TROPONIN T: <0.01 NG/ML
TSH HIGH SENSITIVITY: 3.43 UIU/ML (ref 0.27–4.2)
UROBILINOGEN, URINE: NORMAL MG/DL (ref 0.2–1)
WBC # BLD: 9 K/CU MM (ref 4–10.5)
WBC UA: <1 /HPF (ref 0–2)

## 2020-09-06 PROCEDURE — 81001 URINALYSIS AUTO W/SCOPE: CPT

## 2020-09-06 PROCEDURE — 83690 ASSAY OF LIPASE: CPT

## 2020-09-06 PROCEDURE — 80053 COMPREHEN METABOLIC PANEL: CPT

## 2020-09-06 PROCEDURE — 93010 ELECTROCARDIOGRAM REPORT: CPT | Performed by: INTERNAL MEDICINE

## 2020-09-06 PROCEDURE — U0002 COVID-19 LAB TEST NON-CDC: HCPCS

## 2020-09-06 PROCEDURE — 93005 ELECTROCARDIOGRAM TRACING: CPT | Performed by: EMERGENCY MEDICINE

## 2020-09-06 PROCEDURE — 84443 ASSAY THYROID STIM HORMONE: CPT

## 2020-09-06 PROCEDURE — G0480 DRUG TEST DEF 1-7 CLASSES: HCPCS

## 2020-09-06 PROCEDURE — 6370000000 HC RX 637 (ALT 250 FOR IP): Performed by: EMERGENCY MEDICINE

## 2020-09-06 PROCEDURE — 85025 COMPLETE CBC W/AUTO DIFF WBC: CPT

## 2020-09-06 PROCEDURE — 84484 ASSAY OF TROPONIN QUANT: CPT

## 2020-09-06 PROCEDURE — 80307 DRUG TEST PRSMV CHEM ANLYZR: CPT

## 2020-09-06 RX ORDER — LORAZEPAM 1 MG/1
4 TABLET ORAL
Status: DISCONTINUED | OUTPATIENT
Start: 2020-09-06 | End: 2020-09-06 | Stop reason: HOSPADM

## 2020-09-06 RX ORDER — LORAZEPAM 2 MG/ML
3 INJECTION INTRAMUSCULAR
Status: DISCONTINUED | OUTPATIENT
Start: 2020-09-06 | End: 2020-09-06 | Stop reason: HOSPADM

## 2020-09-06 RX ORDER — LORAZEPAM 1 MG/1
1 TABLET ORAL
Status: DISCONTINUED | OUTPATIENT
Start: 2020-09-06 | End: 2020-09-06 | Stop reason: HOSPADM

## 2020-09-06 RX ORDER — LORAZEPAM 1 MG/1
2 TABLET ORAL
Status: DISCONTINUED | OUTPATIENT
Start: 2020-09-06 | End: 2020-09-06 | Stop reason: HOSPADM

## 2020-09-06 RX ORDER — NICOTINE 21 MG/24HR
1 PATCH, TRANSDERMAL 24 HOURS TRANSDERMAL DAILY
Status: DISCONTINUED | OUTPATIENT
Start: 2020-09-06 | End: 2020-09-06 | Stop reason: HOSPADM

## 2020-09-06 RX ORDER — LORAZEPAM 1 MG/1
3 TABLET ORAL
Status: DISCONTINUED | OUTPATIENT
Start: 2020-09-06 | End: 2020-09-06 | Stop reason: HOSPADM

## 2020-09-06 RX ORDER — LORAZEPAM 2 MG/ML
1 INJECTION INTRAMUSCULAR
Status: DISCONTINUED | OUTPATIENT
Start: 2020-09-06 | End: 2020-09-06 | Stop reason: HOSPADM

## 2020-09-06 RX ORDER — SODIUM CHLORIDE 0.9 % (FLUSH) 0.9 %
10 SYRINGE (ML) INJECTION PRN
Status: DISCONTINUED | OUTPATIENT
Start: 2020-09-06 | End: 2020-09-06 | Stop reason: HOSPADM

## 2020-09-06 RX ORDER — SODIUM CHLORIDE 0.9 % (FLUSH) 0.9 %
10 SYRINGE (ML) INJECTION EVERY 12 HOURS SCHEDULED
Status: DISCONTINUED | OUTPATIENT
Start: 2020-09-06 | End: 2020-09-06 | Stop reason: HOSPADM

## 2020-09-06 RX ORDER — LORAZEPAM 2 MG/ML
2 INJECTION INTRAMUSCULAR
Status: DISCONTINUED | OUTPATIENT
Start: 2020-09-06 | End: 2020-09-06 | Stop reason: HOSPADM

## 2020-09-06 RX ORDER — M-VIT,TX,IRON,MINS/CALC/FOLIC 27MG-0.4MG
1 TABLET ORAL DAILY
Status: DISCONTINUED | OUTPATIENT
Start: 2020-09-06 | End: 2020-09-06 | Stop reason: HOSPADM

## 2020-09-06 RX ORDER — LORAZEPAM 2 MG/ML
4 INJECTION INTRAMUSCULAR
Status: DISCONTINUED | OUTPATIENT
Start: 2020-09-06 | End: 2020-09-06 | Stop reason: HOSPADM

## 2020-09-06 RX ADMIN — MULTIPLE VITAMINS W/ MINERALS TAB 1 TABLET: TAB at 12:07

## 2020-09-06 ASSESSMENT — ENCOUNTER SYMPTOMS
CHOKING: 0
ABDOMINAL PAIN: 0
CHEST TIGHTNESS: 0
APNEA: 0
GASTROINTESTINAL NEGATIVE: 1
VOMITING: 0
PHOTOPHOBIA: 0
SINUS PAIN: 0
WHEEZING: 0
FACIAL SWELLING: 0
EYE DISCHARGE: 0
SHORTNESS OF BREATH: 0
EYE REDNESS: 0
BACK PAIN: 0
COLOR CHANGE: 0
EYE PAIN: 0
RESPIRATORY NEGATIVE: 1
TROUBLE SWALLOWING: 0
RECTAL PAIN: 0
EYE ITCHING: 0
NAUSEA: 0
EYES NEGATIVE: 1
CONSTIPATION: 0
RHINORRHEA: 0
DIARRHEA: 0
COUGH: 0
VOICE CHANGE: 0
BLOOD IN STOOL: 0
SINUS PRESSURE: 0
STRIDOR: 0

## 2020-09-06 NOTE — ED NOTES
Bed: H-01  Expected date:   Expected time:   Means of arrival:   Comments:  C.M Mental health problem        Yared Haddad, KATHLEEN  09/05/20 0336

## 2020-09-06 NOTE — ED NOTES
Pt awake, calm and cooperative. No distress noted. Sitter at the bedside.      Roque Ying RN  09/06/20 60 [facility] Gordon Hidalgo RN  09/06/20 8853

## 2020-09-06 NOTE — ED NOTES
Pt resting in a position of comfort. No needs identified at this time. Bed in lowest position and sitter at bedside 1:1. Suicide precautions maintained.      Joce Brown RN  09/06/20 8163

## 2020-09-06 NOTE — ED NOTES
Patient to the bathroom and changed into a green gown at this time. Urine collected and sent to lab. Patient belongings collected, logged, and locked up per security. Patient back to bed and blanket given per request. Sitter at bedside for safety. Patient denies needs. 0 s/sx of distress.       Nayeli Kohler RN  09/06/20 0828

## 2020-09-06 NOTE — ED NOTES
Pt resting in bed comfortably. No distress noted. Sitter at the bedside.      Dona Head RN  09/06/20 4397

## 2020-09-06 NOTE — ED NOTES
Pt calm and cooperative. No distress noted. Sitter at the bedside.      Eloise Cole RN  09/06/20 1949

## 2020-09-06 NOTE — ED NOTES
Pt resting in bed comfortably. No distress noted. Sitter at the bedside.      Dora Russell RN  09/06/20 8726

## 2020-09-06 NOTE — ED NOTES
Patient gave this nurse prescription for zyprexa. 9 pills noted and witnessed by this nurse and Aliyah Alston RN. Patient prescription locked up with the rest of his belongings per security at this time.       KATHLEEN Andrews  09/06/20 8765

## 2020-09-06 NOTE — ED NOTES
Pt resting in bed comfortably, resp even unlabored. Sitter at the bedside.      Dora Russell RN  09/06/20 7320

## 2020-09-06 NOTE — ED NOTES
This RN and Arthor Sportsman RN takes assumption of care. patient currently resting in room, sitter at bedside. No distress noted.       Vannessa Dubose RN  09/06/20 3914

## 2020-09-06 NOTE — ED NOTES
[facility] CALLED AT 3817 SPOKE WITH KATHY NO THERAPIST TILL [address]  Mauro Wiseman  09/06/20 3793

## 2020-09-06 NOTE — ED NOTES
Pt resting in bed comfortably. Awake, calm and cooperative. Sitter at the bedside.      Christine Vogel RN  09/06/20 6265

## 2020-09-06 NOTE — ED NOTES
Pt resting in a position of comfort. Bed in lowest position and sitter at bedside 1:1. Respirations even, no distress noted. Suicide precautions maintained.      Ronak Mistry RN  09/06/20 6231

## 2020-09-06 NOTE — ED NOTES
No changes in assessment. Quietly resting with sitter at bedside.      Blanca Naqvi RN  09/06/20 5099

## 2020-09-06 NOTE — ED TRIAGE NOTES
Patient to the ED with complaints of homelessness and mental health problem. Patient states that he had \"some suicidal thoughts\" this am, but is not currently suicidal. Patient reports being upset because he was blamed for marijuana that was not his in the homeless shelter he is residing at in [place]. Patient reports that homeless shelter told him that he was now not allowed in the shelter from 7am-7pm, which is when they have all their meals, and was denies his shower privileges. Patient stating that he has now left the homeless shelter in [place], and has no where to go. Patient reports that he is on Zyprexa and that \"it works\", but he did not take it last night because people steal from him and he needs to \"sleep lightly\", but reports that he still has some of the medication on him. Patient does report \"drinking heavily\" today. Patient states that when he was upset this am, he \"thought about getting some bad heroin and ODing\", but he \"bought cigarettes instead\". Patient denies pain.

## 2020-09-06 NOTE — ED NOTES
Pt resting in bed comfortably. NO distress noted. Sitter at the bedside.      Shreya Barnes RN  09/06/20 5309

## 2020-09-06 NOTE — ED NOTES
Patient has been ACCEPTED to [facility] by Dr Natalio Dominique. Nurse to nurse phone number for report is [phone]    Med [facility] ETA 5405.      Ronnie Valencia RN  09/06/20 6843

## 2020-09-06 NOTE — ED NOTES
Pt resting in bed comfortably. Eyes closed, resp even unlabored. Sitter at the bedside.      Daniel Lipscomb RN  09/06/20 0116

## 2020-09-06 NOTE — ED PROVIDER NOTES
[address] ENCOUNTER      Pt Name: Ever Irwin  MRN: [id number]  [place] 1995  Date of evaluation: 9/5/2020  Provider: Connie Eisenberg DO    CHIEF COMPLAINT       Chief Complaint   Patient presents with   [employer] [facility]     Patient stating he was kicked out of a homeless shelter in [facility] and now has no where to go   [address] Problem     patient reports being suicidal this am d/t having no where to go, but no longer suicidal          HISTORY OF PRESENT ILLNESS      Ever Irwin is a 30 y.o. male who presents to the emergency department  for   Chief Complaint   Patient presents with   [facility]     Patient stating he was kicked out of a homeless shelter in [facility] and now has no where to go   [address] Problem     patient reports being suicidal this am d/t having no where to go, but no longer suicidal        The history is provided by the patient. No  was used. Mental Health Problem   Presenting symptoms: depression and suicidal thoughts    Presenting symptoms: no agitation and no self-mutilation    Degree of incapacity (severity):   Moderate  Onset quality:  Sudden  Duration:  1 day  Timing:  Intermittent  Progression:  Waxing and waning  Chronicity:  Recurrent  Context: drug abuse, noncompliance and stressful life event    Context: not alcohol use, not medication and not recent medication change    Treatment compliance:  Some of the time  Relieved by:  None tried  Worsened by:  Nothing  Ineffective treatments:  None tried  Associated symptoms: anxiety    Associated symptoms: no abdominal pain, no anhedonia, no appetite change, no chest pain, no decreased need for sleep, not distractible, no euphoric mood, no fatigue, no feelings of worthlessness, no headaches, no hypersomnia and no poor judgment    Risk factors: hx of mental illness and recent psychiatric admission          Nursing Notes, Triage Notes & Vital Signs were reviewed. REVIEW OF SYSTEMS    (2-9 systems for level 4, 10 or more for level 5)     Review of Systems   Constitutional: Negative. Negative for activity change, appetite change, chills, fatigue and fever. HENT: Negative. Negative for congestion, dental problem, drooling, facial swelling, nosebleeds, postnasal drip, rhinorrhea, sinus pressure, sinus pain, tinnitus, trouble swallowing and voice change. Eyes: Negative. Negative for photophobia, pain, discharge, redness, itching and visual disturbance. Respiratory: Negative. Negative for apnea, cough, choking, chest tightness, shortness of breath, wheezing and stridor. Cardiovascular: Negative. Negative for chest pain, palpitations and leg swelling. Gastrointestinal: Negative. Negative for abdominal pain, blood in stool, constipation, diarrhea, nausea, rectal pain and vomiting. Endocrine: Negative for polyuria. Genitourinary: Negative. Negative for dysuria, flank pain, frequency, hematuria and urgency. Musculoskeletal: Negative. Negative for arthralgias, back pain, gait problem, myalgias, neck pain and neck stiffness. Skin: Negative. Negative for color change, pallor, rash and wound. Neurological: Negative. Negative for dizziness, speech difficulty, light-headedness, numbness and headaches. Psychiatric/Behavioral: Positive for suicidal ideas. Negative for agitation, confusion, self-injury and sleep disturbance. The patient is nervous/anxious. All other systems reviewed and are negative. Except as noted above the remainder of the review of systems was reviewed and negative. PAST MEDICAL HISTORY     Past Medical History:   Diagnosis Date    ADHD     Arachnoid cyst     Asthma     Eczema     GERD (gastroesophageal reflux disease)     Mood disorder, drug-induced ([facility] [address]     Suicidal ideation        Prior to Admission medications    Medication Sig Start Date End Date Taking? Authorizing Provider   OLANZapine (ZYPREXA) 15 MG tablet Take 1 tablet by mouth nightly 8/17/20   Niles Adhikari MD   Fluticasone Furoate-Vilanterol (BREO ELLIPTA IN) Inhale into the lungs    Historical Provider, MD   ipratropium (ATROVENT) 0.02 % nebulizer solution Take 2.5 mLs by nebulization 4 times daily. 9/10/14   Maeve Soto MD        Patient Active Problem List   Diagnosis    Psychosis ([facility] [address]    Leukocytosis    GERD without esophagitis    Mild persistent asthma with acute exacerbation    Bronchitis         SURGICAL HISTORY       Past Surgical History:   Procedure Laterality Date    APPENDECTOMY      BRAIN SURGERY           CURRENT MEDICATIONS       Previous Medications    FLUTICASONE FUROATE-VILANTEROL (BREO ELLIPTA IN)    Inhale into the lungs    IPRATROPIUM (ATROVENT) 0.02 % NEBULIZER SOLUTION    Take 2.5 mLs by nebulization 4 times daily. OLANZAPINE (ZYPREXA) 15 MG TABLET    Take 1 tablet by mouth nightly       ALLERGIES     Latex; Chantix [varenicline];  Haldol [haloperidol]; and Seasonal    FAMILY HISTORY       Family History   Problem Relation Age of Onset    Mental Illness Mother         BAD    Depression Mother     Early Death Mother         Valium, Hydrocodone OD    Substance Abuse Mother     Substance Abuse Sister     Alcohol Abuse Brother     Substance Abuse Brother     Breast Cancer Maternal Grandmother     Cancer Maternal Grandfather         pancreatic          SOCIAL HISTORY       Social History     Socioeconomic History    Marital status: Single     Spouse name: None    Number of children: 0    Years of education: 15    Highest education level: None   Occupational History    Occupation: disability   Social Needs    Financial resource strain: None    Food insecurity     Worry: None     Inability: None    Transportation needs     Medical: None     Non-medical: None   Tobacco Use    Smoking status: Current Every Day Smoker     Packs/day: 2.00 Types: Cigarettes    Smokeless tobacco: Former User   Substance and Sexual Activity    Alcohol use: Yes     Comment: \"lots of alcohol\"    Drug use: Not Currently     Frequency: 5.0 times per week     Types: Marijuana     Comment: pot daily til recently, gets anxiety has used ,eth and cocaine in past, said slows him down    Sexual activity: Not Currently     Partners: Female     Comment: \"straight but sexual orientation is non  sexual\" no sex drive   Lifestyle    Physical activity     Days per week: None     Minutes per session: None    Stress: None   Relationships    Social connections     Talks on phone: None     Gets together: None     Attends Sabianist service: None     Active member of club or organization: None     Attends meetings of clubs or organizations: None     Relationship status: None    Intimate partner violence     Fear of current or ex partner: None     Emotionally abused: None     Physically abused: None     Forced sexual activity: None   Other Topics Concern    None   Social History Narrative    ** Merged History Encounter **            SCREENINGS    Maryellen Coma Scale  Eye Opening: Spontaneous  Best Verbal Response: Oriented  Best Motor Response: Obeys commands  [place] Coma Scale Score: 15          PHYSICAL EXAM    (up to 7 for level 4, 8 or more for level 5)     ED Triage Vitals   BP Temp Temp src Pulse Resp SpO2 Height Weight   09/05/20 2341 09/06/20 0019 -- 09/05/20 2341 09/05/20 2341 09/05/20 2341 09/05/20 2341 09/05/20 2341   116/84 98 °F (36.7 °C)  90 16 95 % 5' 11\" (1.803 m) 200 lb (90.7 kg)       Physical Exam  Vitals signs and nursing note reviewed. Constitutional:       General: He is not in acute distress. Appearance: Normal appearance. He is normal weight. He is not ill-appearing, toxic-appearing or diaphoretic. HENT:      Head: Normocephalic and atraumatic. Right Ear: Tympanic membrane, ear canal and external ear normal. There is no impacted cerumen.       Left Ear: Tympanic membrane, ear canal and external ear normal. There is no impacted cerumen. Nose: Nose normal. No congestion or rhinorrhea. Mouth/Throat:      Mouth: Mucous membranes are dry. Pharynx: Oropharynx is clear. No oropharyngeal exudate or posterior oropharyngeal erythema. Eyes:      General: No scleral icterus. Right eye: No discharge. Left eye: No discharge. Extraocular Movements: Extraocular movements intact. Conjunctiva/sclera: Conjunctivae normal.      Pupils: Pupils are equal, round, and reactive to light. Neck:      Musculoskeletal: Normal range of motion and neck supple. No neck rigidity or muscular tenderness. Vascular: No carotid bruit. Cardiovascular:      Rate and Rhythm: Normal rate. Pulses: Normal pulses. Heart sounds: Normal heart sounds. No murmur. No friction rub. No gallop. Pulmonary:      Effort: Pulmonary effort is normal. No respiratory distress. Breath sounds: Normal breath sounds. No stridor. No wheezing, rhonchi or rales. Chest:      Chest wall: No tenderness. Abdominal:      General: Abdomen is flat. Bowel sounds are normal. There is no distension. Palpations: Abdomen is soft. There is no mass. Tenderness: There is no abdominal tenderness. There is no right CVA tenderness, left CVA tenderness, guarding or rebound. Hernia: No hernia is present. Musculoskeletal: Normal range of motion. General: No swelling, tenderness, deformity or signs of injury. Right lower leg: No edema. Left lower leg: No edema. Lymphadenopathy:      Cervical: No cervical adenopathy. Skin:     Capillary Refill: Capillary refill takes less than 2 seconds. Coloration: Skin is not jaundiced or pale. Findings: No bruising, erythema, lesion or rash. Neurological:      General: No focal deficit present. Mental Status: He is alert and oriented to person, place, and time.  Mental status is at baseline. Cranial Nerves: No cranial nerve deficit. Sensory: No sensory deficit. Motor: No weakness. Coordination: Coordination normal.      Gait: Gait normal.      Deep Tendon Reflexes: Reflexes normal.   Psychiatric:         Attention and Perception: Attention and perception normal.         Mood and Affect: Mood normal.         Behavior: Behavior normal.         Thought Content:  Thought content normal.         Judgment: Judgment normal.         DIAGNOSTIC RESULTS     Labs Reviewed   CBC WITH AUTO DIFFERENTIAL - Abnormal; Notable for the following components:       Result Value    RBC 4.42 (*)     MCH 32.4 (*)     Monocytes % 8.5 (*)     Eosinophils % 5.5 (*)     All other components within normal limits   URINALYSIS - Abnormal; Notable for the following components:    Blood, Urine SMALL (*)     RBC, UA 11 (*)     Mucus, UA RARE (*)     All other components within normal limits   ETHANOL - Abnormal; Notable for the following components:    Alcohol Scrn 0.01 (*)     All other components within normal limits   URINE DRUG SCREEN - Abnormal; Notable for the following components:    Cannabinoid Scrn, Ur UNCONFIRMED POSITIVE (*)     All other components within normal limits   ACETAMINOPHEN LEVEL - Abnormal; Notable for the following components:    Acetaminophen Level <5.0 (*)     All other components within normal limits   SALICYLATE LEVEL - Abnormal; Notable for the following components:    Salicylate Lvl <3.5 (*)     All other components within normal limits   COMPREHENSIVE METABOLIC PANEL W/ REFLEX TO MG FOR LOW K   LIPASE   TROPONIN   TSH WITHOUT REFLEX          EKG: All EKG's are interpreted by the Emergency Department Physician who either signs or Co-signs this chart in the absence of a cardiologist.       EKG Interpretation    Interpreted by emergency department physician    Rhythm: normal sinus   Rate: normal  Axis: normal  Ectopy: none  Conduction: normal  ST Segments: no acute change  T Waves: no acute change  Q Waves: none    Clinical Impression: no acute changes    Maegan Singh     RADIOLOGY:     Non-plain film images such as CT, Ultrasound and MRI are read by the radiologist. Plain radiographic images are visualized and preliminarily interpreted by the emergency physician. Interpretation per the Radiologist below, if available at the time of this note:    No orders to display         ED BEDSIDE ULTRASOUND:   Performed by ED Physician Maegan Singh, DO       LABS:  Labs Reviewed   CBC WITH AUTO DIFFERENTIAL - Abnormal; Notable for the following components:       Result Value    RBC 4.42 (*)     MCH 32.4 (*)     Monocytes % 8.5 (*)     Eosinophils % 5.5 (*)     All other components within normal limits   URINALYSIS - Abnormal; Notable for the following components:    Blood, Urine SMALL (*)     RBC, UA 11 (*)     Mucus, UA RARE (*)     All other components within normal limits   ETHANOL - Abnormal; Notable for the following components:    Alcohol Scrn 0.01 (*)     All other components within normal limits   URINE DRUG SCREEN - Abnormal; Notable for the following components:    Cannabinoid Scrn, Ur UNCONFIRMED POSITIVE (*)     All other components within normal limits   ACETAMINOPHEN LEVEL - Abnormal; Notable for the following components:    Acetaminophen Level <5.0 (*)     All other components within normal limits   SALICYLATE LEVEL - Abnormal; Notable for the following components:    Salicylate Lvl <7.5 (*)     All other components within normal limits   COMPREHENSIVE METABOLIC PANEL W/ REFLEX TO MG FOR LOW K   LIPASE   TROPONIN   TSH WITHOUT REFLEX       All other labs were within normal range or not returned as of this dictation.     EMERGENCY DEPARTMENT COURSE and DIFFERENTIAL DIAGNOSIS/MDM:   Vitals:    Vitals:    09/05/20 2341 09/06/20 0019   BP: 116/84    Pulse: 90    Resp: 16    Temp:  98 °F (36.7 °C)   SpO2: 95%    Weight: 200 lb (90.7 kg)    Height: 5' 11\" (1.803 m) MDM  Number of Diagnoses or Management Options  Diagnosis management comments: 53-year-old male presents emergency department for depression and suicidal ideation. Patient reports that he lost his space at a homeless shelter and has had recurrent thoughts of suicide during the day. Patient reports that he is currently not suicidal but is worried that he may become suicidal if he is unable to find a place to stay. Patient does have a history of mental illness. Patient is medically cleared at this time. Based upon patient's history, recurrent suicidal ideations, recommend evaluation by mental health crisis intervention. Patient also reports significant alcohol abuse and desires detox. Patient understands that placement for alcohol detox may take weeks. We will continue to monitor while in the emergency department. Patient care will be turned over to the day team at 0600. Amount and/or Complexity of Data Reviewed  Clinical lab tests: ordered and reviewed  Tests in the radiology section of CPT®: ordered and reviewed  Tests in the medicine section of CPT®: ordered and reviewed    Risk of Complications, Morbidity, and/or Mortality  Presenting problems: moderate  Diagnostic procedures: moderate  Management options: moderate    Critical Care  Total time providing critical care: < 30 minutes    Patient Progress  Patient progress: improved        REASSESSMENT          CRITICAL CARE TIME     This excludes seperately billable procedures and family discussion time. Critical care time provided for obtaining history, conducting a physical exam, performing and monitoring interventions, ordering, collecting and interpreting tests, and establishing medical decision-making. There was a potential for life/limb threatening pathology requiring close evaluation and intervention with concern for patient decompensation.     CONSULTS:  IP CONSULT TO PSYCHOLOGY  IP CONSULT TO SOCIAL WORK    PROCEDURES:  None performed

## 2020-09-06 NOTE — ED PROVIDER NOTES
applicable):  No follow-up provider specified. Disposition medications (if applicable):  Discharge Medication List as of 9/6/2020  8:53 PM          ED Provider Disposition Time  DISPOSITION            Electronically signed by: Hilton Pichardo M.D., 9/6/2020 11:23 PM      This dictation was created with voice recognition software. While attempts have been made to review the dictation as it is transcribed, on occasion the spoken word can be misinterpreted by the technology leading to omissions or inappropriate words, phrases or sentences.         Lorenzo Horton MD  09/06/20 7132

## 2020-09-06 NOTE — ED NOTES
Pt resting in a position of comfort. No needs identified at this time. Bed in lowest position and sitter at bedside 1:1. Respirations even, no distress noted. Suicide precautions maintained.      Yi Mart RN  09/06/20 0293

## 2020-09-06 NOTE — ED PROVIDER NOTES
This patient was signed out to me by Dr. David Campbell. In short patient presents after losing his spot at the homeless shelter. Due to him having no place to stay he is having  thoughts of suicide. There is been no suicide attempt. No ingestions. Medical work-up shows him to be medically stable at this time. Vital signs have remained stable. Patient is currently pending behavioral health evaluation. Patient has been resting comfortably during my care of the patient. Patient was assessed by behavioral health. They did speak with access and access will be taking over for possible placement. Cover test was ordered. Patient is signed out with Dr. Sanya Green. Please see his note for final disposition and plan.      Tiffanie Enrique, DO  09/06/20 9832

## 2020-09-06 NOTE — ED NOTES
Pt awake, calm and cooperative. Eating dinner at this time. Sitter at the bedside.      Danny Kendall RN  09/06/20 5364

## 2020-09-07 NOTE — ED NOTES
Able to call report at this time to [facility].       Alejandra Jackman RN  09/06/20 0614

## 2020-09-07 NOTE — ED NOTES
Attempted to call report to nurse; transferred to unanswered phone.       Elda Torres, KATHLEEN  09/06/20 2006

## 2020-09-09 LAB
EKG ATRIAL RATE: 80 BPM
EKG DIAGNOSIS: NORMAL
EKG P AXIS: 62 DEGREES
EKG P-R INTERVAL: 104 MS
EKG Q-T INTERVAL: 394 MS
EKG QRS DURATION: 128 MS
EKG QTC CALCULATION (BAZETT): 454 MS
EKG R AXIS: 2 DEGREES
EKG T AXIS: 86 DEGREES
EKG VENTRICULAR RATE: 80 BPM
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
## 2020-12-11 NOTE — CARE COORDINATION
Pt identified as potential readmission. Last admission 8/11 - 8/17 for Mental Health Problem--Patient came in by [place] [employer]--stated that he was walking by COVID tent wanting a test, when they went to take his temperature, patient expressed ideas of self-harm, harm to others including president. Patient himself states he is not on his medications and needs those filled. States he wishes he could go to sleep and not wake up but not actively planning on killing himself. Patient had been placed for Mental Health. Pt here today for homeless and suicidal.    Consult received for patient. Patient is pending mental health consult--then CM will assist patient regarding homelessness. LSW spoke to Dr. Tish Valencia  About patient. [facility] before CM can consult with patient. 14:00 CM spoke to [facility]' with [address] and [facility]' reported that patient will have a \"Dual DX and MH will be looking for placement for patient. \"     CM will close out consult for patient at this time. Ketoconazole Counseling:   Patient counseled regarding improving absorption with orange juice.  Adverse effects include but are not limited to breast enlargement, headache, diarrhea, nausea, upset stomach, liver function test abnormalities, taste disturbance, and stomach pain.  There is a rare possibility of liver failure that can occur when taking ketoconazole. The patient understands that monitoring of LFTs may be required, especially at baseline. The patient verbalized understanding of the proper use and possible adverse effects of ketoconazole.  All of the patient's questions and concerns were addressed.

## 2020-12-17 ENCOUNTER — HOSPITAL ENCOUNTER (EMERGENCY)
Age: 25
Discharge: HOME | End: 2020-12-17
Attending: EMERGENCY MEDICINE
Payer: COMMERCIAL

## 2020-12-17 VITALS
SYSTOLIC BLOOD PRESSURE: 122 MMHG | DIASTOLIC BLOOD PRESSURE: 65 MMHG | BODY MASS INDEX: 35 KG/M2 | HEART RATE: 74 BPM | RESPIRATION RATE: 16 BRPM | TEMPERATURE: 98.2 F | OXYGEN SATURATION: 95 % | HEIGHT: 71 IN | WEIGHT: 250 LBS

## 2020-12-17 LAB
SARS-COV-2, RAPID: NORMAL
SARS-COV-2: NORMAL
SARS-COV-2: NOT DETECTED
SOURCE: NORMAL

## 2020-12-17 PROCEDURE — 99282 EMERGENCY DEPT VISIT SF MDM: CPT

## 2020-12-17 PROCEDURE — 99281 EMR DPT VST MAYX REQ PHY/QHP: CPT

## 2020-12-17 PROCEDURE — U0003 INFECTIOUS AGENT DETECTION BY NUCLEIC ACID (DNA OR RNA); SEVERE ACUTE RESPIRATORY SYNDROME CORONAVIRUS 2 (SARS-COV-2) (CORONAVIRUS DISEASE [COVID-19]), AMPLIFIED PROBE TECHNIQUE, MAKING USE OF HIGH THROUGHPUT TECHNOLOGIES AS DESCRIBED BY CMS-2020-01-R: HCPCS

## 2020-12-18 ENCOUNTER — CARE COORDINATION (OUTPATIENT)
Dept: CARE COORDINATION | Age: 25
End: 2020-12-18

## 2020-12-18 NOTE — CARE COORDINATION
Patient contacted regarding Margret Buenoibaldi. Discussed COVID-19 related testing which was available at this time. Test results were negative. Patient informed of results, if available? Yes    Care Transition Nurse/ Ambulatory Care Manager contacted the patient by telephone to perform post discharge assessment. Call within 2 business days of discharge: Yes. Verified name and  with patient as identifiers. Provided introduction to self, and explanation of the CTN/ACM role, and reason for call due to risk factors for infection and/or exposure to COVID-19. Symptoms reviewed with patient who verbalized the following symptoms: diarrhea. Due to no new or worsening symptoms encounter was not routed to provider for escalation. Discussed follow-up appointments. If no appointment was previously scheduled, appointment scheduling offered: Yes  [facility] follow up appointment(s): No future appointments. Non-[facility] follow up appointment(s): will [facility] current pcp does not take his insurance    Non-face-to-face services provided:  Reviewed and followed up on pending diagnostic tests and treatments-covid      Advance Care Planning:   Does patient have an Advance Directive:  decision maker updated. Patient has following risk factors of: no known risk factors. CTN/ACM reviewed discharge instructions, medical action plan and red flags such as increased shortness of breath, increasing fever and signs of decompensation with patient who verbalized understanding. Discussed exposure protocols and quarantine with CDC Guidelines What to do if you are sick with coronavirus disease .  Patient was given an opportunity for questions and concerns. The patient agrees to contact the [facility] exposure line [phone], local [facility] department [place] [facility]: ([phone]) and PCP office for questions related to their healthcare. CTN/ACM provided contact information for future needs.     Reviewed and educated

## 2020-12-19 ASSESSMENT — ENCOUNTER SYMPTOMS
DIARRHEA: 0
COUGH: 0
WHEEZING: 0
NAUSEA: 0
COLOR CHANGE: 0
VOMITING: 0
ABDOMINAL PAIN: 0
CONSTIPATION: 0
SORE THROAT: 0
SINUS PRESSURE: 0
RHINORRHEA: 0
SHORTNESS OF BREATH: 0

## 2020-12-19 NOTE — ED PROVIDER NOTES
[address] [place] [address] ED  eMERGENCY dEPARTMENT eNCOUnter      Pt Name: Bear Christianson  MRN: [id number]  [place] 1995  Date of evaluation: 12/17/2020  Provider: [address] [place] [address] NP, ELAYNE Acevedo 1468       Chief Complaint   Patient presents with    Fatigue     all symptoms began yesterday.  Heartburn    Sweats         HISTORY OF PRESENT ILLNESS  (Location/Symptom, Timing/Onset, Context/Setting, Quality, Duration, Modifying Factors, Severity.)   Bear Christianson is a 30 y.o. male who presents to the emergency department today by private vehicle for evaluation of fatigue. Patient states onset of symptoms was yesterday. He states that he has some generalized fatigue and malaise. He also has some hot and cold sweats. He has got a mild cough but denies any shortness of breath or chest pain. He denies any measured fever but has had some chills he was complaining of some heartburn after he had some Captain crunch. He rated the heartburn a 4 on a 0-to-10 scale. He does have a history of GERD. He states that he did start taking Prozac 3 months ago. Nursing Notes were reviewed. ALLERGIES     Latex, Chantix [varenicline], Haldol [haloperidol], and Seasonal    CURRENT MEDICATIONS       Discharge Medication List as of 12/17/2020 12:05 PM      CONTINUE these medications which have NOT CHANGED    Details   OLANZapine (ZYPREXA) 15 MG tablet Take 1 tablet by mouth nightly, Disp-30 tablet,R-0Normal      Fluticasone Furoate-Vilanterol (BREO ELLIPTA IN) Inhale into the lungsHistorical Med      ipratropium (ATROVENT) 0.02 % nebulizer solution Take 2.5 mLs by nebulization 4 times daily. , Disp-50 mL, R-1Print             PAST MEDICAL HISTORY         Diagnosis Date    ADHD     Arachnoid cyst     Asthma     Eczema     GERD (gastroesophageal reflux disease)     Mood disorder, drug-induced ([facility] [address]     Suicidal ideation        SURGICAL HISTORY           Procedure Laterality Date    APPENDECTOMY 122/65 98.2 °F (36.8 °C)  74 16 95 % 5' 11\" (1.803 m) 250 lb (113.4 kg)       Physical Exam  Vitals signs reviewed. Constitutional:       Appearance: He is well-developed. HENT:      Head: Normocephalic and atraumatic. Eyes:      Conjunctiva/sclera: Conjunctivae normal.      Pupils: Pupils are equal, round, and reactive to light. Neck:      Musculoskeletal: Normal range of motion and neck supple. Cardiovascular:      Rate and Rhythm: Normal rate and regular rhythm. Pulmonary:      Effort: Pulmonary effort is normal. No respiratory distress. Breath sounds: Normal breath sounds. No stridor. Abdominal:      General: Bowel sounds are normal.      Palpations: Abdomen is soft. Musculoskeletal: Normal range of motion. Lymphadenopathy:      Cervical: No cervical adenopathy. Skin:     General: Skin is warm and dry. Findings: No rash. Neurological:      Mental Status: He is alert and oriented to person, place, and time. LABS:  Labs Reviewed   COVID-19       All other labs were within normal range or not returned as of this dictation. EMERGENCY DEPARTMENT COURSE and DIFFERENTIAL DIAGNOSIS/MDM:   Vitals:    Vitals:    12/17/20 1128 12/17/20 1132   BP: 122/65    Pulse: 74    Resp: 16    Temp: 98.2 °F (36.8 °C)    SpO2: 95%    Weight:  250 lb (113.4 kg)   Height:  5' 11\" (1.803 m)       Medical Decision Making: Patient will have a Covid test done. The Covid test is pending. He was told that his symptoms may be related to the Prozac may be it is too high of a dose. He was told that if his Covid swab comes back negative to call his psychiatrist regarding the medication  FINAL IMPRESSION      1.  Chills          DISPOSITION/PLAN   DISPOSITION Decision To Discharge 12/17/2020 11:48:48 AM      PATIENT REFERRED TO:   Sherri Rivera MD  [address] [place] [address]   [facility]     Schedule an appointment as soon as possible for a visit       [facility] ED  [facility] [address] [facility]  [phone]    If symptoms worsen      DISCHARGE MEDICATIONS:     Discharge Medication List as of 12/17/2020 12:05 PM              (Please note that portions of this note were completed with a voice recognition program.  Efforts were made to edit the dictations but occasionally words are mis-transcribed.)    [address] [facility] NP, APRN - CNP  Certified Nurse Practitioner          ELAYNE Patton - CLAUDY  12/19/20 7630

## 2021-03-14 ENCOUNTER — HOSPITAL ENCOUNTER (EMERGENCY)
Age: 26
Discharge: HOME | End: 2021-03-14
Attending: EMERGENCY MEDICINE
Payer: COMMERCIAL

## 2021-03-14 VITALS
OXYGEN SATURATION: 97 % | WEIGHT: 267.9 LBS | DIASTOLIC BLOOD PRESSURE: 78 MMHG | HEART RATE: 86 BPM | SYSTOLIC BLOOD PRESSURE: 150 MMHG | RESPIRATION RATE: 16 BRPM | BODY MASS INDEX: 37.36 KG/M2 | TEMPERATURE: 98.4 F

## 2021-03-14 DIAGNOSIS — H10.9 CONJUNCTIVITIS OF LEFT EYE, UNSPECIFIED CONJUNCTIVITIS TYPE: Primary | ICD-10-CM

## 2021-03-14 PROCEDURE — 99282 EMERGENCY DEPT VISIT SF MDM: CPT

## 2021-03-14 RX ORDER — ERYTHROMYCIN 5 MG/G
OINTMENT OPHTHALMIC
Qty: 3.5 G | Refills: 0 | Status: SHIPPED | OUTPATIENT
Start: 2021-03-14

## 2021-03-14 ASSESSMENT — ENCOUNTER SYMPTOMS
EYE REDNESS: 1
COLOR CHANGE: 0
EYE DISCHARGE: 1
VOMITING: 0
DIARRHEA: 0
SHORTNESS OF BREATH: 0
FACIAL SWELLING: 0
COUGH: 0
ABDOMINAL PAIN: 0
CONSTIPATION: 0

## 2021-03-14 NOTE — ED PROVIDER NOTES
[address] [place] [address] ED  EMERGENCY DEPARTMENT ENCOUNTER      Pt Name: Berto Recio  MRN: [id number]  [place] 1995  Date of evaluation: 3/14/2021  Provider: Aster Silva MD    [address] [place] [address]       Chief Complaint   Patient presents with    Conjunctivitis    Headache         HISTORY OF PRESENT ILLNESS  (Location/Symptom, Timing/Onset, Context/Setting, Quality, Duration, Modifying Factors, Severity.)   Berto Recio is a 30 y.o. male who presents to the emergency department for left eye redness and drainage. He was diagnosed with pinkeye and put on some eyedrops but it does not seem to be getting better. No injury or foreign body. Symptoms are continuous. It is making his head hurt and he rates that as a 4. Nursing Notes were reviewed. ALLERGIES     Latex, Chantix [varenicline], Haldol [haloperidol], and Seasonal    CURRENT MEDICATIONS       Previous Medications    FLUTICASONE FUROATE-VILANTEROL (BREO ELLIPTA IN)    Inhale into the lungs    IPRATROPIUM (ATROVENT) 0.02 % NEBULIZER SOLUTION    Take 2.5 mLs by nebulization 4 times daily.     OLANZAPINE (ZYPREXA) 15 MG TABLET    Take 1 tablet by mouth nightly       PAST MEDICAL HISTORY         Diagnosis Date    ADHD     Arachnoid cyst     Asthma     Eczema     GERD (gastroesophageal reflux disease)     Mood disorder, drug-induced (HCC)     Suicidal ideation        SURGICAL HISTORY           Procedure Laterality Date    APPENDECTOMY      BRAIN SURGERY           FAMILY HISTORY           Problem Relation Age of Onset    Mental Illness Mother         BAD    Depression Mother     Early Death Mother         Valium, Hydrocodone OD    Substance Abuse Mother     Substance Abuse Sister     Alcohol Abuse Brother     Substance Abuse Brother     Breast Cancer Maternal Grandmother     Cancer Maternal Grandfather         pancreatic     Family Status   Relation Name Status    Mother     [facility] Sister  (Not Specified)    Brother  (Not Specified)    MGM      MGF  (Not Specified)        SOCIAL HISTORY      reports that he has been smoking cigarettes. He has been smoking about 2.00 packs per day. He has quit using smokeless tobacco. He reports current alcohol use. He reports previous drug use. Frequency: 5.00 times per week. Drug: Marijuana. REVIEW OF SYSTEMS    (2-9 systems for level 4, 10 or more for level 5)     Review of Systems   Constitutional: Negative for chills, fatigue and fever. HENT: Negative for congestion, ear discharge and facial swelling. Eyes: Positive for discharge and redness. Respiratory: Negative for cough and shortness of breath. Cardiovascular: Negative for chest pain. Gastrointestinal: Negative for abdominal pain, constipation, diarrhea and vomiting. Genitourinary: Negative for dysuria and hematuria. Musculoskeletal: Negative for arthralgias. Skin: Negative for color change and rash. Neurological: Negative for syncope, numbness and headaches. Hematological: Negative for adenopathy. Psychiatric/Behavioral: Negative for confusion. The patient is not nervous/anxious. Except as noted above the remainder of the review of systems was reviewed and negative. PHYSICAL EXAM    (up to 7 for level 4, 8 or more for level 5)     Vitals:    21 1441 21 1442   BP:  (!) 150/78   Pulse:  86   Resp:  16   Temp:  98.4 °F (36.9 °C)   SpO2:  97%   Weight: 267 lb 14.4 oz (121.5 kg)        Physical Exam  Vitals signs reviewed. Constitutional:       General: He is not in acute distress. Appearance: He is well-developed. He is not diaphoretic. HENT:      Head: Normocephalic and atraumatic. Eyes:      General: No scleral icterus. Comments: Right conjunctiva appears normal.  Left is injected and there is a small amount of drainage. Cornea clear. No foreign bodies. No periorbital swelling or erythema. Neck:      Musculoskeletal: Neck supple.    Cardiovascular:      Rate and Rhythm: Normal rate and regular rhythm. Pulmonary:      Effort: Pulmonary effort is normal. No respiratory distress. Breath sounds: Normal breath sounds. No stridor. No wheezing or rales. Abdominal:      General: There is no distension. Palpations: Abdomen is soft. Tenderness: There is no abdominal tenderness. Musculoskeletal: Normal range of motion. Lymphadenopathy:      Cervical: No cervical adenopathy. Skin:     General: Skin is warm and dry. Findings: No erythema or rash. Neurological:      Mental Status: He is alert and oriented to person, place, and time. Psychiatric:         Behavior: Behavior normal.             DIAGNOSTIC RESULTS     EKG: All EKG's are interpreted by the Emergency Department Physician who either signs or Co-signs this chart in the absence of a cardiologist.    Not indicated    RADIOLOGY:   Non-plain film images such as CT, Ultrasound and MRI are read by the radiologist. Plain radiographic images are visualized and preliminarily interpreted by the emergency physician with the below findings:    Not indicated    Interpretation per the Radiologist below, if available at the time of this note:        LABS:  Labs Reviewed - No data to display    All other labs were within normal range or not returned as of this dictation. EMERGENCY DEPARTMENT COURSE and DIFFERENTIAL DIAGNOSIS/MDM:   Vitals:    Vitals:    03/14/21 1441 03/14/21 1442   BP:  (!) 150/78   Pulse:  86   Resp:  16   Temp:  98.4 °F (36.9 °C)   SpO2:  97%   Weight: 267 lb 14.4 oz (121.5 kg)        Orders Placed This Encounter   Medications    erythromycin (ROMYCIN) 5 MG/GM ophthalmic ointment     Sig: Apply 1 cm to affected area 3 times daily     Dispense:  3.5 g     Refill:  0       Medical Decision Making: He will be switched to erythromycin ointment and was referred to ophthalmology in the event he does not improve.   Treatment diagnosis and follow-up were discussed with the patient. CONSULTS:  None    PROCEDURES:  None    FINAL IMPRESSION      1.  Conjunctivitis of left eye, unspecified conjunctivitis type          DISPOSITION/PLAN   DISPOSITION Decision To Discharge 03/14/2021 02:51:03 PM      PATIENT REFERRED TO:   [facility] ED  [address] [facility]  [phone]    If symptoms worsen    [facility] [address] [facility]  [phone]            DISCHARGE MEDICATIONS:     New Prescriptions    ERYTHROMYCIN [facility]) 5 MG/GM OPHTHALMIC OINTMENT    Apply 1 cm to affected area 3 times daily         (Please note that portions of this note were completed with a voice recognition program.  Efforts were made to edit the dictations but occasionally words are mis-transcribed.)    Kaci Hunt MD  Attending Emergency Physician           Kaci Hunt MD  03/14/21 2078

## 2021-06-12 ENCOUNTER — APPOINTMENT (OUTPATIENT)
Dept: GENERAL RADIOLOGY | Age: 26
End: 2021-06-12
Payer: COMMERCIAL

## 2021-06-12 ENCOUNTER — HOSPITAL ENCOUNTER (EMERGENCY)
Age: 26
Discharge: HOME | End: 2021-06-12
Attending: EMERGENCY MEDICINE
Payer: COMMERCIAL

## 2021-06-12 VITALS
RESPIRATION RATE: 16 BRPM | HEART RATE: 66 BPM | HEIGHT: 71 IN | SYSTOLIC BLOOD PRESSURE: 133 MMHG | BODY MASS INDEX: 39.2 KG/M2 | TEMPERATURE: 98.5 F | DIASTOLIC BLOOD PRESSURE: 65 MMHG | OXYGEN SATURATION: 95 % | WEIGHT: 280 LBS

## 2021-06-12 DIAGNOSIS — R60.9 PERIPHERAL EDEMA: Primary | ICD-10-CM

## 2021-06-12 LAB
ABSOLUTE EOS #: 0.23 K/UL (ref 0–0.44)
ABSOLUTE IMMATURE GRANULOCYTE: 0.05 K/UL (ref 0–0.3)
ABSOLUTE LYMPH #: 2.22 K/UL (ref 1.1–3.7)
ABSOLUTE MONO #: 0.73 K/UL (ref 0.1–1.2)
ALBUMIN SERPL-MCNC: 4.1 G/DL (ref 3.5–5.2)
ALBUMIN/GLOBULIN RATIO: ABNORMAL (ref 1–2.5)
ALP BLD-CCNC: 90 U/L (ref 40–129)
ALT SERPL-CCNC: 29 U/L (ref 5–41)
ANION GAP SERPL CALCULATED.3IONS-SCNC: 9 MMOL/L (ref 9–17)
AST SERPL-CCNC: 24 U/L
BASOPHILS # BLD: 1 % (ref 0–2)
BASOPHILS ABSOLUTE: 0.06 K/UL (ref 0–0.2)
BILIRUB SERPL-MCNC: 0.23 MG/DL (ref 0.3–1.2)
BILIRUBIN DIRECT: <0.08 MG/DL
BILIRUBIN, INDIRECT: ABNORMAL MG/DL (ref 0–1)
BNP INTERPRETATION: NORMAL
BUN BLDV-MCNC: 11 MG/DL (ref 6–20)
BUN/CREAT BLD: 12 (ref 9–20)
CALCIUM SERPL-MCNC: 9.4 MG/DL (ref 8.6–10.4)
CHLORIDE BLD-SCNC: 101 MMOL/L (ref 98–107)
CO2: 27 MMOL/L (ref 20–31)
CREAT SERPL-MCNC: 0.92 MG/DL (ref 0.7–1.2)
DIFFERENTIAL TYPE: ABNORMAL
EOSINOPHILS RELATIVE PERCENT: 3 % (ref 1–4)
GFR AFRICAN AMERICAN: >60 ML/MIN
GFR NON-AFRICAN AMERICAN: >60 ML/MIN
GFR SERPL CREATININE-BSD FRML MDRD: NORMAL ML/MIN/{1.73_M2}
GFR SERPL CREATININE-BSD FRML MDRD: NORMAL ML/MIN/{1.73_M2}
GLOBULIN: ABNORMAL G/DL (ref 1.5–3.8)
GLUCOSE BLD-MCNC: 94 MG/DL (ref 70–99)
HCT VFR BLD CALC: 44.2 % (ref 40.7–50.3)
HEMOGLOBIN: 14.8 G/DL (ref 13–17)
IMMATURE GRANULOCYTES: 1 %
LYMPHOCYTES # BLD: 26 % (ref 24–43)
MAGNESIUM: 1.8 MG/DL (ref 1.6–2.6)
MCH RBC QN AUTO: 31.6 PG (ref 25.2–33.5)
MCHC RBC AUTO-ENTMCNC: 33.5 G/DL (ref 28.4–34.8)
MCV RBC AUTO: 94.2 FL (ref 82.6–102.9)
MONOCYTES # BLD: 9 % (ref 3–12)
NRBC AUTOMATED: 0 PER 100 WBC
PDW BLD-RTO: 13.6 % (ref 11.8–14.4)
PLATELET # BLD: 298 K/UL (ref 138–453)
PLATELET ESTIMATE: ABNORMAL
PMV BLD AUTO: 9.2 FL (ref 8.1–13.5)
POTASSIUM SERPL-SCNC: 4.7 MMOL/L (ref 3.7–5.3)
PRO-BNP: 61 PG/ML
RBC # BLD: 4.69 M/UL (ref 4.21–5.77)
RBC # BLD: ABNORMAL 10*6/UL
SEG NEUTROPHILS: 60 % (ref 36–65)
SEGMENTED NEUTROPHILS ABSOLUTE COUNT: 5.15 K/UL (ref 1.5–8.1)
SODIUM BLD-SCNC: 137 MMOL/L (ref 135–144)
TOTAL PROTEIN: 7.1 G/DL (ref 6.4–8.3)
TROPONIN INTERP: NORMAL
TROPONIN T: NORMAL NG/ML
TROPONIN, HIGH SENSITIVITY: <6 NG/L (ref 0–22)
WBC # BLD: 8.4 K/UL (ref 3.5–11.3)
WBC # BLD: ABNORMAL 10*3/UL

## 2021-06-12 PROCEDURE — 99284 EMERGENCY DEPT VISIT MOD MDM: CPT

## 2021-06-12 PROCEDURE — 85025 COMPLETE CBC W/AUTO DIFF WBC: CPT

## 2021-06-12 PROCEDURE — 71045 X-RAY EXAM CHEST 1 VIEW: CPT

## 2021-06-12 PROCEDURE — 84484 ASSAY OF TROPONIN QUANT: CPT

## 2021-06-12 PROCEDURE — 80048 BASIC METABOLIC PNL TOTAL CA: CPT

## 2021-06-12 PROCEDURE — 83880 ASSAY OF NATRIURETIC PEPTIDE: CPT

## 2021-06-12 PROCEDURE — 80076 HEPATIC FUNCTION PANEL: CPT

## 2021-06-12 PROCEDURE — 83735 ASSAY OF MAGNESIUM: CPT

## 2021-06-12 PROCEDURE — 93005 ELECTROCARDIOGRAM TRACING: CPT | Performed by: EMERGENCY MEDICINE

## 2021-06-12 RX ORDER — FLUOXETINE 10 MG/1
60 CAPSULE ORAL NIGHTLY
COMMUNITY

## 2021-06-12 RX ORDER — NALTREXONE HYDROCHLORIDE 50 MG/1
50 TABLET, FILM COATED ORAL NIGHTLY
COMMUNITY

## 2021-06-12 RX ORDER — BUPROPION HYDROCHLORIDE 150 MG/1
150 TABLET ORAL NIGHTLY
COMMUNITY

## 2021-06-12 ASSESSMENT — ENCOUNTER SYMPTOMS
RHINORRHEA: 0
EYE DISCHARGE: 0
EYE REDNESS: 0
COUGH: 0
SORE THROAT: 0
NAUSEA: 0
VOMITING: 0
COLOR CHANGE: 0
DIARRHEA: 0
SHORTNESS OF BREATH: 0

## 2021-06-12 NOTE — ED PROVIDER NOTES
EMERGENCY DEPARTMENT ENCOUNTER    Pt Name: Yanet Marino  MRN: [id number]  [place] 1995  Date of evaluation: 6/12/21  CHIEF COMPLAINT       Chief Complaint   Patient presents with    Swelling     bilat feet, onset 1.5 week     HISTORY OF PRESENT ILLNESS   This is a 47-year-old male that presents with complaints of bilateral lower extremity swelling. The patient states that he has developed this swelling ongoing over the past few days. The patient states that he lost his glasses, he has been dealing with the swelling for the past few weeks while he was playing video games. Patient states that he spends long periods of time on his feet. Patient denies any chest pain or shortness of breath, he denies any paroxysmal nocturnal dyspnea. Patient describes his symptoms as severe. REVIEW OF SYSTEMS     Review of Systems   Constitutional: Negative for chills and fever. HENT: Negative for rhinorrhea and sore throat. Eyes: Negative for discharge, redness and visual disturbance. Respiratory: Negative for cough and shortness of breath. Cardiovascular: Positive for leg swelling. Negative for chest pain and palpitations. Gastrointestinal: Negative for diarrhea, nausea and vomiting. Musculoskeletal: Negative for arthralgias, myalgias and neck pain. Skin: Negative for color change and rash. Neurological: Negative for seizures, weakness and headaches. Psychiatric/Behavioral: Negative for hallucinations, self-injury and suicidal ideas.      PASTMEDICAL HISTORY     Past Medical History:   Diagnosis Date    ADHD     Arachnoid cyst     Asthma     Eczema     GERD (gastroesophageal reflux disease)     Mood disorder, drug-induced ([facility] [address]     Suicidal ideation      Past Problem List  Patient Active Problem List   Diagnosis Code    Psychosis ([facility] [address] F29    Leukocytosis D72.829    GERD without esophagitis K21.9    Mild persistent asthma with acute exacerbation J45.31    Bronchitis J40 SURGICAL HISTORY       Past Surgical History:   Procedure Laterality Date    APPENDECTOMY      BRAIN SURGERY       CURRENT MEDICATIONS       Current Discharge Medication List      CONTINUE these medications which have NOT CHANGED    Details   FLUoxetine (PROZAC) 10 MG capsule Take 60 mg by mouth nightly      naltrexone (DEPADE) 50 MG tablet Take 50 mg by mouth nightly      buPROPion (WELLBUTRIN XL) 150 MG extended release tablet Take 150 mg by mouth nightly      ALBUTEROL IN Inhale into the lungs as needed      OLANZapine (ZYPREXA) 15 MG tablet Take 1 tablet by mouth nightly  Qty: 30 tablet, Refills: 0      !! erythromycin (ROMYCIN) 5 MG/GM ophthalmic ointment Apply 1 cm to affected area 3 times daily  Qty: 3.5 g, Refills: 0      !! erythromycin (ROMYCIN) 5 MG/GM ophthalmic ointment Apply 1 cm to affected area 3 times daily  Qty: 3.5 g, Refills: 0      Fluticasone Furoate-Vilanterol (BREO ELLIPTA IN) Inhale into the lungs      ipratropium (ATROVENT) 0.02 % nebulizer solution Take 2.5 mLs by nebulization 4 times daily. Qty: 50 mL, Refills: 1       !! - Potential duplicate medications found. Please discuss with provider. ALLERGIES     is allergic to latex, chantix [varenicline], haldol [haloperidol], and seasonal.  FAMILY HISTORY     He indicated that his mother is . He indicated that the status of his sister is unknown. He indicated that the status of his brother is unknown. He indicated that his maternal grandmother is . He indicated that the status of his maternal grandfather is unknown.      SOCIAL HISTORY       Social History     Tobacco Use    Smoking status: Current Every Day Smoker     Packs/day: 2.00     Types: Cigarettes    Smokeless tobacco: Former User   Vaping Use    Vaping Use: Former    Substances: Always   Substance Use Topics    Alcohol use: Yes     Comment: \"lots of alcohol\"    Drug use: Not Currently     Frequency: 5.0 times per week     Types: Marijuana Comment: pot daily til recently, gets anxiety has used ,eth and cocaine in past, said slows him down     PHYSICAL EXAM     INITIAL VITALS: /65   Pulse 66   Temp 98.5 °F (36.9 °C) (Oral)   Resp 16   Ht 5' 11\" (1.803 m)   Wt 280 lb (127 kg)   SpO2 95%   BMI 39.05 kg/m²    Physical Exam  Constitutional:       Appearance: Normal appearance. He is well-developed. He is not ill-appearing or toxic-appearing. HENT:      Head: Normocephalic and atraumatic. Eyes:      Conjunctiva/sclera: Conjunctivae normal.      Pupils: Pupils are equal, round, and reactive to light. Neck:      Trachea: Trachea normal.   Cardiovascular:      Rate and Rhythm: Normal rate and regular rhythm. Heart sounds: S1 normal and S2 normal. No murmur heard. Pulmonary:      Effort: Pulmonary effort is normal. No accessory muscle usage or respiratory distress. Breath sounds: Normal breath sounds. Chest:      Chest wall: No deformity or tenderness. Abdominal:      General: Bowel sounds are normal. There is no distension or abdominal bruit. Palpations: Abdomen is not rigid. Tenderness: There is no abdominal tenderness. There is no guarding or rebound. Musculoskeletal:      Cervical back: Normal range of motion and neck supple. Right lower le+ Pitting Edema present. Left lower le+ Pitting Edema present. Skin:     General: Skin is warm. Findings: No rash. Neurological:      Mental Status: He is alert and oriented to person, place, and time. GCS: GCS eye subscore is 4. GCS verbal subscore is 5. GCS motor subscore is 6. Psychiatric:         Speech: Speech normal.         MEDICAL DECISION MAKIN-year-old male presents with complaints of lower extremity swelling bilaterally, mild. Plan is basic labs, BNP and reevaluation.     3:06 PM EDT  Patient's BNP and cardiac laboratory studies and renal function are unremarkable, albumin normal.  I do not believe these are bilateral lower extremity DVTs, the patient does not have any significant risk factors, no palpable cord, no redness. Plan is discharged with instructions to elevate his legs, avoid long periods of time standing and return if symptoms worsen or change. CRITICAL CARE:       PROCEDURES:    Procedures    DIAGNOSTIC RESULTS   EKG:All EKG's are interpreted by the Emergency Department Physician who either signs or Co-signs this chart in the absence of a cardiologist.        RADIOLOGY:All plain film, CT, MRI, and formal ultrasound images (except ED bedside ultrasound) are read by the radiologist, see reports below, unless otherwisenoted in MDM or here. XR CHEST PORTABLE   Final Result   Unremarkable chest.           LABS: All lab results were reviewed by myself, and all abnormals are listed below. Labs Reviewed   CBC WITH AUTO DIFFERENTIAL - Abnormal; Notable for the following components:       Result Value    Immature Granulocytes 1 (*)     All other components within normal limits   HEPATIC FUNCTION PANEL - Abnormal; Notable for the following components: Total Bilirubin 0.23 (*)     All other components within normal limits   BASIC METABOLIC PANEL   BRAIN NATRIURETIC PEPTIDE   MAGNESIUM   TROPONIN   TROPONIN       EMERGENCY DEPARTMENTCOURSE:         Vitals:    Vitals:    06/12/21 1229   BP: 133/65   Pulse: 66   Resp: 16   Temp: 98.5 °F (36.9 °C)   TempSrc: Oral   SpO2: 95%   Weight: 280 lb (127 kg)   Height: 5' 11\" (1.803 m)       The patient was given the following medications while in the emergency department:  No orders of the defined types were placed in this encounter. CONSULTS:  None    FINAL IMPRESSION      1.  Peripheral edema          DISPOSITION/PLAN   DISPOSITION Decision To Discharge 06/12/2021 03:05:13 PM      PATIENT REFERRED TO:  Rubén Root MD  [address]  [phone]    Schedule an appointment as soon as possible for a visit in 2 days      DISCHARGE MEDICATIONS:  Current Discharge Medication List        Kye Sepulveda MD  Attending Emergency Physician                   Kye Sepulveda MD  06/12/21 1754

## 2021-06-14 LAB
EKG ATRIAL RATE: 55 BPM
EKG P AXIS: 17 DEGREES
EKG P-R INTERVAL: 104 MS
EKG Q-T INTERVAL: 468 MS
EKG QRS DURATION: 138 MS
EKG QTC CALCULATION (BAZETT): 447 MS
EKG R AXIS: -18 DEGREES
EKG T AXIS: 71 DEGREES
EKG VENTRICULAR RATE: 55 BPM

## 2021-06-14 PROCEDURE — 93010 ELECTROCARDIOGRAM REPORT: CPT | Performed by: INTERNAL MEDICINE

## 2022-01-13 ENCOUNTER — APPOINTMENT (OUTPATIENT)
Dept: CT IMAGING | Age: 27
End: 2022-01-13
Payer: COMMERCIAL

## 2022-01-13 ENCOUNTER — HOSPITAL ENCOUNTER (EMERGENCY)
Age: 27
Discharge: HOME | End: 2022-01-13
Attending: EMERGENCY MEDICINE
Payer: COMMERCIAL

## 2022-01-13 VITALS
SYSTOLIC BLOOD PRESSURE: 115 MMHG | BODY MASS INDEX: 39.2 KG/M2 | HEIGHT: 71 IN | OXYGEN SATURATION: 96 % | HEART RATE: 83 BPM | TEMPERATURE: 97.9 F | RESPIRATION RATE: 18 BRPM | DIASTOLIC BLOOD PRESSURE: 67 MMHG | WEIGHT: 280 LBS

## 2022-01-13 DIAGNOSIS — R10.9 FLANK PAIN: Primary | ICD-10-CM

## 2022-01-13 LAB
-: NORMAL
-: NORMAL
ABSOLUTE EOS #: 0.35 K/UL (ref 0–0.44)
ABSOLUTE IMMATURE GRANULOCYTE: 0.03 K/UL (ref 0–0.3)
ABSOLUTE LYMPH #: 4.14 K/UL (ref 1.1–3.7)
ABSOLUTE MONO #: 0.87 K/UL (ref 0.1–1.2)
ALBUMIN SERPL-MCNC: 4.1 G/DL (ref 3.5–5.2)
ALBUMIN/GLOBULIN RATIO: ABNORMAL (ref 1–2.5)
ALP BLD-CCNC: 81 U/L (ref 40–129)
ALT SERPL-CCNC: 38 U/L (ref 5–41)
AMORPHOUS: NORMAL
AMYLASE: 36 U/L (ref 28–100)
ANION GAP SERPL CALCULATED.3IONS-SCNC: 11 MMOL/L (ref 9–17)
AST SERPL-CCNC: 24 U/L
BACTERIA: NORMAL
BASOPHILS # BLD: 1 % (ref 0–2)
BASOPHILS ABSOLUTE: 0.07 K/UL (ref 0–0.2)
BILIRUB SERPL-MCNC: 0.26 MG/DL (ref 0.3–1.2)
BILIRUBIN URINE: NEGATIVE
BUN BLDV-MCNC: 15 MG/DL (ref 6–20)
BUN/CREAT BLD: 19 (ref 9–20)
CALCIUM SERPL-MCNC: 9 MG/DL (ref 8.6–10.4)
CASTS UA: NORMAL /LPF
CHLORIDE BLD-SCNC: 105 MMOL/L (ref 98–107)
CO2: 23 MMOL/L (ref 20–31)
COLOR: YELLOW
COMMENT UA: NORMAL
CREAT SERPL-MCNC: 0.79 MG/DL (ref 0.7–1.2)
CRYSTALS, UA: NORMAL /HPF
DIFFERENTIAL TYPE: ABNORMAL
EOSINOPHILS RELATIVE PERCENT: 3 % (ref 1–4)
EPITHELIAL CELLS UA: NORMAL /HPF (ref 0–5)
GFR AFRICAN AMERICAN: >60 ML/MIN
GFR NON-AFRICAN AMERICAN: >60 ML/MIN
GFR SERPL CREATININE-BSD FRML MDRD: ABNORMAL ML/MIN/{1.73_M2}
GFR SERPL CREATININE-BSD FRML MDRD: ABNORMAL ML/MIN/{1.73_M2}
GLUCOSE BLD-MCNC: 89 MG/DL (ref 70–99)
GLUCOSE URINE: NEGATIVE
HCT VFR BLD CALC: 45 % (ref 40.7–50.3)
HEMOGLOBIN: 15.1 G/DL (ref 13–17)
IMMATURE GRANULOCYTES: 0 %
KETONES, URINE: NEGATIVE
LEUKOCYTE ESTERASE, URINE: NEGATIVE
LIPASE: 31 U/L (ref 13–60)
LYMPHOCYTES # BLD: 38 % (ref 24–43)
MCH RBC QN AUTO: 31.2 PG (ref 25.2–33.5)
MCHC RBC AUTO-ENTMCNC: 33.6 G/DL (ref 28.4–34.8)
MCV RBC AUTO: 93 FL (ref 82.6–102.9)
MONOCYTES # BLD: 8 % (ref 3–12)
MUCUS: NORMAL
NITRITE, URINE: NEGATIVE
NRBC AUTOMATED: 0 PER 100 WBC
OTHER OBSERVATIONS UA: NORMAL
PDW BLD-RTO: 13.4 % (ref 11.8–14.4)
PH UA: 6 (ref 5–8)
PLATELET # BLD: 312 K/UL (ref 138–453)
PLATELET ESTIMATE: ABNORMAL
PMV BLD AUTO: 9.4 FL (ref 8.1–13.5)
POTASSIUM SERPL-SCNC: 3.9 MMOL/L (ref 3.7–5.3)
PROTEIN UA: NEGATIVE
RBC # BLD: 4.84 M/UL (ref 4.21–5.77)
RBC # BLD: ABNORMAL 10*6/UL
RBC UA: NORMAL /HPF (ref 0–2)
REASON FOR REJECTION: NORMAL
RENAL EPITHELIAL, UA: NORMAL /HPF
SEG NEUTROPHILS: 50 % (ref 36–65)
SEGMENTED NEUTROPHILS ABSOLUTE COUNT: 5.33 K/UL (ref 1.5–8.1)
SODIUM BLD-SCNC: 139 MMOL/L (ref 135–144)
SPECIFIC GRAVITY UA: 1.02 (ref 1–1.03)
TOTAL PROTEIN: 6.7 G/DL (ref 6.4–8.3)
TRICHOMONAS: NORMAL
TURBIDITY: CLEAR
URINE HGB: NEGATIVE
UROBILINOGEN, URINE: NORMAL
WBC # BLD: 10.8 K/UL (ref 3.5–11.3)
WBC # BLD: ABNORMAL 10*3/UL
WBC UA: NORMAL /HPF (ref 0–5)
YEAST: NORMAL
ZZ NTE CLEAN UP: ORDERED TEST: NORMAL
ZZ NTE WITH NAME CLEAN UP: SPECIMEN SOURCE: NORMAL

## 2022-01-13 PROCEDURE — 82150 ASSAY OF AMYLASE: CPT

## 2022-01-13 PROCEDURE — 99282 EMERGENCY DEPT VISIT SF MDM: CPT

## 2022-01-13 PROCEDURE — 36415 COLL VENOUS BLD VENIPUNCTURE: CPT

## 2022-01-13 PROCEDURE — 80053 COMPREHEN METABOLIC PANEL: CPT

## 2022-01-13 PROCEDURE — 81001 URINALYSIS AUTO W/SCOPE: CPT

## 2022-01-13 PROCEDURE — 74176 CT ABD & PELVIS W/O CONTRAST: CPT

## 2022-01-13 PROCEDURE — 83690 ASSAY OF LIPASE: CPT

## 2022-01-13 PROCEDURE — 85025 COMPLETE CBC W/AUTO DIFF WBC: CPT

## 2022-01-13 RX ORDER — IBUPROFEN 800 MG/1
800 TABLET ORAL EVERY 6 HOURS PRN
Qty: 25 TABLET | Refills: 1 | Status: SHIPPED | OUTPATIENT
Start: 2022-01-13

## 2022-01-13 RX ORDER — TAMSULOSIN HYDROCHLORIDE 0.4 MG/1
0.4 CAPSULE ORAL DAILY
Qty: 7 CAPSULE | Refills: 0 | Status: SHIPPED | OUTPATIENT
Start: 2022-01-13

## 2022-01-13 RX ORDER — TAMSULOSIN HYDROCHLORIDE 0.4 MG/1
0.4 CAPSULE ORAL ONCE
Status: DISCONTINUED | OUTPATIENT
Start: 2022-01-13 | End: 2022-01-13 | Stop reason: HOSPADM

## 2022-01-13 ASSESSMENT — ENCOUNTER SYMPTOMS
CHEST TIGHTNESS: 0
FACIAL SWELLING: 0
ABDOMINAL PAIN: 0
BACK PAIN: 0
EYE DISCHARGE: 0
ABDOMINAL DISTENTION: 0
SHORTNESS OF BREATH: 0
EYE PAIN: 0

## 2022-01-13 ASSESSMENT — PAIN SCALES - GENERAL: PAINLEVEL_OUTOF10: 7

## 2022-01-13 ASSESSMENT — PAIN DESCRIPTION - LOCATION: LOCATION: BACK;FLANK

## 2022-01-13 NOTE — ED PROVIDER NOTES
EMERGENCY DEPARTMENT ENCOUNTER    Pt Name: Jair Lamas  MRN: [id number]  [place] 1995  Date of evaluation: 1/13/22  CHIEF COMPLAINT       Chief Complaint   Patient presents with    Back Pain     h/o kidney stones    Flank Pain     HISTORY OF PRESENT ILLNESS   HPI   Patient is a 58-year-old male who presented to the emergency department secondary to flank pain. Patient complains of a 3-day history of pain localized to his left flank with radiation to his right flank. Rates the pain 7 out of 10 on the pain scale. States the pain feels similar in nature to previous kidney stone which passed years ago without surgical intervention. States he has been urinating more and drinking more water. Denied dysuria or hematuria. Patient denied previous history of pancreatitis diverticulitis or gallstones. Patient denied recent antibiotic use or travel outside the country. She denied diarrhea patient had chest pain, shortness of breath, nausea, vomiting, fevers or chills    REVIEW OF SYSTEMS     Review of Systems   Constitutional: Negative for chills, diaphoresis and fever. HENT: Negative for congestion, ear pain and facial swelling. Eyes: Negative for pain, discharge and visual disturbance. Respiratory: Negative for chest tightness and shortness of breath. Cardiovascular: Negative for chest pain and palpitations. Gastrointestinal: Negative for abdominal distention and abdominal pain. Genitourinary: Positive for flank pain and frequency. Negative for difficulty urinating. Musculoskeletal: Negative for back pain. Skin: Negative for wound. Neurological: Negative for dizziness, light-headedness and headaches.      PASTMEDICAL HISTORY     Past Medical History:   Diagnosis Date    ADHD     Arachnoid cyst     Asthma     Eczema     GERD (gastroesophageal reflux disease)     Mood disorder, drug-induced ([facility] [address]     Suicidal ideation      SURGICAL HISTORY       Past Surgical History:   Procedure Laterality Date    APPENDECTOMY      BRAIN SURGERY       CURRENT MEDICATIONS       Previous Medications    ALBUTEROL IN    Inhale into the lungs as needed    BUPROPION (WELLBUTRIN XL) 150 MG EXTENDED RELEASE TABLET    Take 150 mg by mouth nightly    ERYTHROMYCIN (ROMYCIN) 5 MG/GM OPHTHALMIC OINTMENT    Apply 1 cm to affected area 3 times daily    ERYTHROMYCIN (ROMYCIN) 5 MG/GM OPHTHALMIC OINTMENT    Apply 1 cm to affected area 3 times daily    FLUOXETINE (PROZAC) 10 MG CAPSULE    Take 60 mg by mouth nightly    FLUTICASONE FUROATE-VILANTEROL (BREO ELLIPTA IN)    Inhale into the lungs    IPRATROPIUM (ATROVENT) 0.02 % NEBULIZER SOLUTION    Take 2.5 mLs by nebulization 4 times daily. NALTREXONE (DEPADE) 50 MG TABLET    Take 50 mg by mouth nightly    OLANZAPINE (ZYPREXA) 15 MG TABLET    Take 1 tablet by mouth nightly     ALLERGIES     is allergic to latex, chantix [varenicline], haldol [haloperidol], and seasonal.  FAMILY HISTORY     He indicated that his mother is . He indicated that the status of his sister is unknown. He indicated that the status of his brother is unknown. He indicated that his maternal grandmother is . He indicated that the status of his maternal grandfather is unknown.      SOCIAL HISTORY       Social History     Tobacco Use    Smoking status: Current Every Day Smoker     Packs/day: 2.00     Types: Cigarettes    Smokeless tobacco: Former User   Vaping Use    Vaping Use: Former    Substances: Always   Substance Use Topics    Alcohol use: Yes     Comment: \"lots of alcohol\"    Drug use: Not Currently     Frequency: 5.0 times per week     Types: Marijuana (Weed)     Comment: pot daily til recently, gets anxiety has used ,eth and cocaine in past, said slows him down     PHYSICAL EXAM     INITIAL VITALS: /67   Pulse 83   Temp 97.9 °F (36.6 °C) (Oral)   Resp 18   Ht 5' 11\" (1.803 m)   Wt 280 lb (127 kg)   SpO2 96%   BMI 39.05 kg/m²    Physical Exam  Vitals and nursing note reviewed. Constitutional:       General: He is not in acute distress. Appearance: He is well-developed. He is not diaphoretic. HENT:      Head: Normocephalic and atraumatic. Eyes:      Pupils: Pupils are equal, round, and reactive to light. Cardiovascular:      Rate and Rhythm: Normal rate and regular rhythm. Pulmonary:      Effort: Pulmonary effort is normal.      Breath sounds: Normal breath sounds. Abdominal:      General: Bowel sounds are normal.      Palpations: Abdomen is soft. Tenderness: There is no abdominal tenderness. There is right CVA tenderness. There is no guarding. Hernia: No hernia is present. Musculoskeletal:         General: Normal range of motion. Cervical back: Normal range of motion and neck supple. Skin:     General: Skin is warm. Capillary Refill: Capillary refill takes less than 2 seconds. Neurological:      Mental Status: He is alert and oriented to person, place, and time. MEDICAL DECISION MAKING:   Patient is a 69-year-old male who presented to the emergency department secondary to flank pain. Orders for CT abdomen pelvis without contrast placed, UA and labs. Patient was offered analgesia he declined. Patient will be reevaluated. No acute findings on laboratory analysis or imaging. Discussed with patient and possible small radiolucent kidney stone given patient symptoms. Received Flomax in the emergency department. Discharged home with prescription for Flomax and ibuprofen, given outpatient follow-up and parameters to return to the emergency department. All patient's question's and concerns were answered prior to disposition and patient and/or family expressed understanding and agreement of treatment plan.         CRITICAL CARE:              NIH STROKE SCALE:            PROCEDURES:    Procedures    DIAGNOSTIC RESULTS   EKG:All EKG's are interpreted by the Emergency Department Physician who either signs or Co-signs this chart in the absence of a cardiologist.        RADIOLOGY:All plain film, CT, MRI, and formal ultrasound images (except ED bedside ultrasound) are read by the radiologist, see reports below, unless otherwisenoted in MDM or here. CT ABDOMEN PELVIS WO CONTRAST Additional Contrast? None   Final Result   Right-sided punctate nephrolithiasis. Otherwise negative noncontrast CT   examination of the abdomen and pelvis with no evidence of obstructive   uropathy or other acute process. LABS: All lab results were reviewed by myself, and all abnormals are listed below. Labs Reviewed   CBC WITH AUTO DIFFERENTIAL - Abnormal; Notable for the following components:       Result Value    Absolute Lymph # 4.14 (*)     All other components within normal limits   COMPREHENSIVE METABOLIC PANEL W/ REFLEX TO MG FOR LOW K - Abnormal; Notable for the following components: Total Bilirubin 0.26 (*)     All other components within normal limits   URINALYSIS WITH MICROSCOPIC   AMYLASE   LIPASE   SPECIMEN REJECTION       EMERGENCY DEPARTMENTCOURSE:         Vitals:    Vitals:    01/13/22 0809   BP: 115/67   Pulse: 83   Resp: 18   Temp: 97.9 °F (36.6 °C)   TempSrc: Oral   SpO2: 96%   Weight: 280 lb (127 kg)   Height: 5' 11\" (1.803 m)       The patient was given the following medications while in the emergency department:  Orders Placed This Encounter   Medications    tamsulosin (FLOMAX) capsule 0.4 mg    tamsulosin (FLOMAX) 0.4 MG capsule     Sig: Take 1 capsule by mouth daily     Dispense:  7 capsule     Refill:  0    ibuprofen (ADVIL;MOTRIN) 800 MG tablet     Sig: Take 1 tablet by mouth every 6 hours as needed for Pain     Dispense:  25 tablet     Refill:  1     CONSULTS:  None    FINAL IMPRESSION      1.  Flank pain          DISPOSITION/PLAN   DISPOSITION        PATIENT REFERRED TO:  Keysha Khan MD  [address] [address]  [phone]          DISCHARGE MEDICATIONS:  New Prescriptions    IBUPROFEN (ADVIL;MOTRIN) 800

## 2022-09-15 NOTE — ED PROVIDER NOTES
as needed for Wheezing or Shortness of Breath With spacer (and mask if indicated). Thanks. 1 Inhaler 1    Respiratory Therapy Supplies (NEBULIZER COMPRESSOR) KIT 1 kit by Does not apply route once for 1 dose. 1 kit 0    ipratropium (ATROVENT) 0.02 % nebulizer solution Take 2.5 mLs by nebulization 4 times daily. 50 mL 1    traZODone (DESYREL) 50 MG tablet Take 50 mg by mouth nightly.  ziprasidone (GEODON) 80 MG capsule Take 60 mg by mouth nightly.          ALLERGIES    Allergies   Allergen Reactions    Latex Itching    Chantix [Varenicline] Other (See Comments)     Causes suicidal thoughts    Seasonal Other (See Comments)     Dust mites, pollen, weed, etc - \"it makes my asthma act up\"       SOCIAL & FAMILYHISTORY    Social History     Socioeconomic History    Marital status: Single     Spouse name: None    Number of children: None    Years of education: None    Highest education level: None   Occupational History    None   Social Needs    Financial resource strain: None    Food insecurity     Worry: None     Inability: None    Transportation needs     Medical: None     Non-medical: None   Tobacco Use    Smoking status: Current Every Day Smoker     Packs/day: 1.50     Types: Cigarettes    Smokeless tobacco: Never Used   Substance and Sexual Activity    Alcohol use: Yes     Comment: occasionally    Drug use: Yes     Frequency: 5.0 times per week     Types: Cocaine, Marijuana, Methamphetamines     Comment: pt states before he arrived to ED he ate what he thought was Caitlyn Sexual activity: Never   Lifestyle    Physical activity     Days per week: None     Minutes per session: None    Stress: None   Relationships    Social connections     Talks on phone: None     Gets together: None     Attends [employer] service: None     Active member of club or organization: None     Attends meetings of clubs or organizations: None     Relationship status: None    Intimate partner violence     Fear of current or ex partner: None     Emotionally abused: None     Physically abused: None     Forced sexual activity: None   Other Topics Concern    None   Social History Narrative    ** Merged History Encounter **          History reviewed. No pertinent family history. PHYSICAL EXAM    VITAL SIGNS: BP (!) 140/82   Pulse 81   Temp 98.1 °F (36.7 °C) (Oral)   Resp 16   SpO2 97%    Constitutional:  Well developed, well nourished, no acute distress  Eyes:  EOMI. PERRL, conjunctiva normal   HENT:  Atraumatic, external ears normal, nose normal   Neck/Lymphatics: supple, no JVD, no swollen nodes. No thyromegaly or thyroid nodules. Respiratory:  No respiratory distress, normal breath sounds  Cardiovascular:  Normal rate, normal rhythm, no murmurs  GI:  Soft, nondistended, normal bowel sounds, nontender  Musculoskeletal:  No edema, no acute deformities   Left lower extremity exam: No swelling or discoloration or external signs of trauma. No tenderness or palpable defect. Full range of motion of ankle and all toes without obvious deficit. Achilles tendon intact. Distal capillary refill, sensation intact. Integument:  Well hydrated   Neurologic:  Awake alert and oriented, no slurred speech, normal gross motor coordination and strength   Psychiatric: Patient tearful, flight of thoughts.       LABS:  Results for orders placed or performed during the hospital encounter of 05/16/20   CBC Auto Differential   Result Value Ref Range    WBC 13.1 (H) 4.0 - 10.5 K/CU MM    RBC 4.83 4.6 - 6.2 M/CU MM    Hemoglobin 15.7 13.5 - 18.0 GM/DL    Hematocrit 46.8 42 - 52 %    MCV 96.9 78 - 100 FL    MCH 32.5 (H) 27 - 31 PG    MCHC 33.5 32.0 - 36.0 %    RDW 14.4 11.7 - 14.9 %    Platelets 516 588 - 971 K/CU MM    MPV 8.8 7.5 - 11.1 FL    Differential Type AUTOMATED DIFFERENTIAL     Segs Relative 76.8 (H) 36 - 66 %    Lymphocytes % 15.5 (L) 24 - 44 %    Monocytes % 6.2 (H) 0 - 4 %    Eosinophils % 0.8 0 - 3 %    Basophils % 0.5 0 - 1 % Strong peripheral pulses/Capillary refill less/equal to 2 seconds

## 2022-10-31 NOTE — ED NOTES
Transport team arrived, forms given to crew.  Pt changed into blue gown and security provided belongings and given to transport team.     Abdi Davey RN  09/06/20 2043 31-Oct-2022 14:27